# Patient Record
Sex: FEMALE | Race: WHITE | NOT HISPANIC OR LATINO | URBAN - METROPOLITAN AREA
[De-identification: names, ages, dates, MRNs, and addresses within clinical notes are randomized per-mention and may not be internally consistent; named-entity substitution may affect disease eponyms.]

---

## 2024-05-17 ENCOUNTER — OFFICE VISIT (OUTPATIENT)
Dept: WOUND CARE | Facility: CLINIC | Age: 84
End: 2024-05-17
Payer: MEDICARE

## 2024-05-17 PROCEDURE — 11042 DBRDMT SUBQ TIS 1ST 20SQCM/<: CPT

## 2024-05-17 PROCEDURE — 99213 OFFICE O/P EST LOW 20 MIN: CPT

## 2024-05-24 ENCOUNTER — OFFICE VISIT (OUTPATIENT)
Dept: WOUND CARE | Facility: CLINIC | Age: 84
End: 2024-05-24
Payer: MEDICARE

## 2024-05-24 PROCEDURE — 99212 OFFICE O/P EST SF 10 MIN: CPT

## 2024-05-31 ENCOUNTER — OFFICE VISIT (OUTPATIENT)
Dept: WOUND CARE | Facility: CLINIC | Age: 84
End: 2024-05-31
Payer: MEDICARE

## 2024-05-31 PROCEDURE — 99212 OFFICE O/P EST SF 10 MIN: CPT

## 2025-02-03 ENCOUNTER — APPOINTMENT (OUTPATIENT)
Dept: CARDIOLOGY | Facility: HOSPITAL | Age: 85
End: 2025-02-03
Payer: MEDICARE

## 2025-02-03 ENCOUNTER — APPOINTMENT (OUTPATIENT)
Dept: RADIOLOGY | Facility: HOSPITAL | Age: 85
End: 2025-02-03
Payer: MEDICARE

## 2025-02-03 ENCOUNTER — HOSPITAL ENCOUNTER (INPATIENT)
Facility: HOSPITAL | Age: 85
LOS: 2 days | Discharge: HOME | End: 2025-02-06
Attending: EMERGENCY MEDICINE | Admitting: FAMILY MEDICINE
Payer: MEDICARE

## 2025-02-03 DIAGNOSIS — K56.609 SMALL BOWEL OBSTRUCTION (MULTI): Primary | ICD-10-CM

## 2025-02-03 DIAGNOSIS — R33.9 URINARY RETENTION: ICD-10-CM

## 2025-02-03 LAB
ALBUMIN SERPL BCP-MCNC: 4.1 G/DL (ref 3.4–5)
ALP SERPL-CCNC: 53 U/L (ref 33–136)
ALT SERPL W P-5'-P-CCNC: 10 U/L (ref 7–45)
ANION GAP BLDV CALCULATED.4IONS-SCNC: 10 MMOL/L (ref 10–25)
ANION GAP SERPL CALC-SCNC: 15 MMOL/L (ref 10–20)
APPEARANCE UR: ABNORMAL
AST SERPL W P-5'-P-CCNC: 22 U/L (ref 9–39)
ATRIAL RATE: 94 BPM
BACTERIA #/AREA URNS AUTO: ABNORMAL /HPF
BASE EXCESS BLDV CALC-SCNC: 3.3 MMOL/L (ref -2–3)
BASOPHILS # BLD AUTO: 0.04 X10*3/UL (ref 0–0.1)
BASOPHILS NFR BLD AUTO: 0.3 %
BILIRUB SERPL-MCNC: 0.9 MG/DL (ref 0–1.2)
BILIRUB UR STRIP.AUTO-MCNC: NEGATIVE MG/DL
BODY TEMPERATURE: ABNORMAL
BUN SERPL-MCNC: 13 MG/DL (ref 6–23)
CA-I BLDV-SCNC: 1.2 MMOL/L (ref 1.1–1.33)
CALCIUM SERPL-MCNC: 9.4 MG/DL (ref 8.6–10.3)
CHLORIDE BLDV-SCNC: 101 MMOL/L (ref 98–107)
CHLORIDE SERPL-SCNC: 101 MMOL/L (ref 98–107)
CO2 SERPL-SCNC: 24 MMOL/L (ref 21–32)
COLOR UR: YELLOW
CREAT SERPL-MCNC: 0.69 MG/DL (ref 0.5–1.05)
EGFRCR SERPLBLD CKD-EPI 2021: 85 ML/MIN/1.73M*2
EOSINOPHIL # BLD AUTO: 0.05 X10*3/UL (ref 0–0.4)
EOSINOPHIL NFR BLD AUTO: 0.4 %
ERYTHROCYTE [DISTWIDTH] IN BLOOD BY AUTOMATED COUNT: 17.6 % (ref 11.5–14.5)
GLUCOSE BLDV-MCNC: 101 MG/DL (ref 74–99)
GLUCOSE SERPL-MCNC: 105 MG/DL (ref 74–99)
GLUCOSE UR STRIP.AUTO-MCNC: NORMAL MG/DL
HCO3 BLDV-SCNC: 24.7 MMOL/L (ref 22–26)
HCT VFR BLD AUTO: 32.6 % (ref 36–46)
HCT VFR BLD EST: 33 % (ref 36–46)
HGB BLD-MCNC: 10.4 G/DL (ref 12–16)
HGB BLDV-MCNC: 11 G/DL (ref 12–16)
HOLD SPECIMEN: NORMAL
IMM GRANULOCYTES # BLD AUTO: 0.06 X10*3/UL (ref 0–0.5)
IMM GRANULOCYTES NFR BLD AUTO: 0.5 % (ref 0–0.9)
INHALED O2 CONCENTRATION: 45 %
KETONES UR STRIP.AUTO-MCNC: ABNORMAL MG/DL
LACTATE BLDV-SCNC: 1.7 MMOL/L (ref 0.4–2)
LEUKOCYTE ESTERASE UR QL STRIP.AUTO: ABNORMAL
LIPASE SERPL-CCNC: 12 U/L (ref 9–82)
LYMPHOCYTES # BLD AUTO: 1.53 X10*3/UL (ref 0.8–3)
LYMPHOCYTES NFR BLD AUTO: 12.1 %
MCH RBC QN AUTO: 25.6 PG (ref 26–34)
MCHC RBC AUTO-ENTMCNC: 31.9 G/DL (ref 32–36)
MCV RBC AUTO: 80 FL (ref 80–100)
MONOCYTES # BLD AUTO: 0.88 X10*3/UL (ref 0.05–0.8)
MONOCYTES NFR BLD AUTO: 7 %
MUCOUS THREADS #/AREA URNS AUTO: ABNORMAL /LPF
NEUTROPHILS # BLD AUTO: 10.05 X10*3/UL (ref 1.6–5.5)
NEUTROPHILS NFR BLD AUTO: 79.7 %
NITRITE UR QL STRIP.AUTO: ABNORMAL
NRBC BLD-RTO: 0 /100 WBCS (ref 0–0)
OXYHGB MFR BLDV: 18.9 % (ref 45–75)
P AXIS: 54 DEGREES
P OFFSET: 176 MS
P ONSET: 146 MS
PCO2 BLDV: 27 MM HG (ref 41–51)
PH BLDV: 7.57 PH (ref 7.33–7.43)
PH UR STRIP.AUTO: 8.5 [PH]
PLATELET # BLD AUTO: 391 X10*3/UL (ref 150–450)
PO2 BLDV: 15 MM HG (ref 35–45)
POTASSIUM BLDV-SCNC: 4 MMOL/L (ref 3.5–5.3)
POTASSIUM SERPL-SCNC: 4.3 MMOL/L (ref 3.5–5.3)
PR INTERVAL: 162 MS
PROT SERPL-MCNC: 7.2 G/DL (ref 6.4–8.2)
PROT UR STRIP.AUTO-MCNC: ABNORMAL MG/DL
Q ONSET: 227 MS
QRS COUNT: 16 BEATS
QRS DURATION: 78 MS
QT INTERVAL: 372 MS
QTC CALCULATION(BAZETT): 465 MS
QTC FREDERICIA: 432 MS
R AXIS: 41 DEGREES
RBC # BLD AUTO: 4.06 X10*6/UL (ref 4–5.2)
RBC # UR STRIP.AUTO: ABNORMAL /UL
RBC #/AREA URNS AUTO: >20 /HPF
SAO2 % BLDV: 19 % (ref 45–75)
SODIUM BLDV-SCNC: 132 MMOL/L (ref 136–145)
SODIUM SERPL-SCNC: 136 MMOL/L (ref 136–145)
SP GR UR STRIP.AUTO: >1.05
SQUAMOUS #/AREA URNS AUTO: ABNORMAL /HPF
T AXIS: -2 DEGREES
T OFFSET: 413 MS
UROBILINOGEN UR STRIP.AUTO-MCNC: NORMAL MG/DL
VENTRICULAR RATE: 94 BPM
WBC # BLD AUTO: 12.6 X10*3/UL (ref 4.4–11.3)
WBC #/AREA URNS AUTO: >50 /HPF
YEAST BUDDING #/AREA UR COMP ASSIST: PRESENT /HPF

## 2025-02-03 PROCEDURE — 99223 1ST HOSP IP/OBS HIGH 75: CPT | Performed by: SURGERY

## 2025-02-03 PROCEDURE — 87186 SC STD MICRODIL/AGAR DIL: CPT | Mod: STJLAB

## 2025-02-03 PROCEDURE — 81001 URINALYSIS AUTO W/SCOPE: CPT

## 2025-02-03 PROCEDURE — 83690 ASSAY OF LIPASE: CPT

## 2025-02-03 PROCEDURE — 2500000004 HC RX 250 GENERAL PHARMACY W/ HCPCS (ALT 636 FOR OP/ED): Performed by: EMERGENCY MEDICINE

## 2025-02-03 PROCEDURE — 99223 1ST HOSP IP/OBS HIGH 75: CPT | Performed by: FAMILY MEDICINE

## 2025-02-03 PROCEDURE — 99285 EMERGENCY DEPT VISIT HI MDM: CPT | Performed by: EMERGENCY MEDICINE

## 2025-02-03 PROCEDURE — 36415 COLL VENOUS BLD VENIPUNCTURE: CPT

## 2025-02-03 PROCEDURE — 87086 URINE CULTURE/COLONY COUNT: CPT | Mod: STJLAB

## 2025-02-03 PROCEDURE — 96365 THER/PROPH/DIAG IV INF INIT: CPT | Mod: 59

## 2025-02-03 PROCEDURE — 84132 ASSAY OF SERUM POTASSIUM: CPT | Performed by: EMERGENCY MEDICINE

## 2025-02-03 PROCEDURE — 82435 ASSAY OF BLOOD CHLORIDE: CPT | Performed by: EMERGENCY MEDICINE

## 2025-02-03 PROCEDURE — 85025 COMPLETE CBC W/AUTO DIFF WBC: CPT

## 2025-02-03 PROCEDURE — 84075 ASSAY ALKALINE PHOSPHATASE: CPT

## 2025-02-03 PROCEDURE — 2500000004 HC RX 250 GENERAL PHARMACY W/ HCPCS (ALT 636 FOR OP/ED): Performed by: INTERNAL MEDICINE

## 2025-02-03 PROCEDURE — 2550000001 HC RX 255 CONTRASTS: Performed by: EMERGENCY MEDICINE

## 2025-02-03 PROCEDURE — 96372 THER/PROPH/DIAG INJ SC/IM: CPT | Performed by: INTERNAL MEDICINE

## 2025-02-03 PROCEDURE — 74177 CT ABD & PELVIS W/CONTRAST: CPT

## 2025-02-03 PROCEDURE — 99285 EMERGENCY DEPT VISIT HI MDM: CPT | Mod: 25 | Performed by: EMERGENCY MEDICINE

## 2025-02-03 PROCEDURE — 36415 COLL VENOUS BLD VENIPUNCTURE: CPT | Performed by: EMERGENCY MEDICINE

## 2025-02-03 PROCEDURE — 74177 CT ABD & PELVIS W/CONTRAST: CPT | Mod: FOREIGN READ | Performed by: RADIOLOGY

## 2025-02-03 PROCEDURE — 99222 1ST HOSP IP/OBS MODERATE 55: CPT | Performed by: NURSE PRACTITIONER

## 2025-02-03 PROCEDURE — 2500000004 HC RX 250 GENERAL PHARMACY W/ HCPCS (ALT 636 FOR OP/ED)

## 2025-02-03 PROCEDURE — 93005 ELECTROCARDIOGRAM TRACING: CPT

## 2025-02-03 PROCEDURE — 2500000004 HC RX 250 GENERAL PHARMACY W/ HCPCS (ALT 636 FOR OP/ED): Mod: JZ

## 2025-02-03 PROCEDURE — 96375 TX/PRO/DX INJ NEW DRUG ADDON: CPT

## 2025-02-03 PROCEDURE — 96361 HYDRATE IV INFUSION ADD-ON: CPT

## 2025-02-03 RX ORDER — HYDROMORPHONE HYDROCHLORIDE 1 MG/ML
1 INJECTION, SOLUTION INTRAMUSCULAR; INTRAVENOUS; SUBCUTANEOUS EVERY 4 HOURS PRN
Status: DISCONTINUED | OUTPATIENT
Start: 2025-02-03 | End: 2025-02-05

## 2025-02-03 RX ORDER — AMLODIPINE BESYLATE 2.5 MG/1
2.5 TABLET ORAL EVERY EVENING
COMMUNITY

## 2025-02-03 RX ORDER — ENOXAPARIN SODIUM 100 MG/ML
40 INJECTION SUBCUTANEOUS DAILY
Status: DISCONTINUED | OUTPATIENT
Start: 2025-02-03 | End: 2025-02-06 | Stop reason: HOSPADM

## 2025-02-03 RX ORDER — CEFTRIAXONE 1 G/50ML
1 INJECTION, SOLUTION INTRAVENOUS ONCE
Status: COMPLETED | OUTPATIENT
Start: 2025-02-03 | End: 2025-02-03

## 2025-02-03 RX ORDER — CYCLOBENZAPRINE HCL 5 MG
5 TABLET ORAL NIGHTLY PRN
COMMUNITY

## 2025-02-03 RX ORDER — HYDROMORPHONE HYDROCHLORIDE 1 MG/ML
INJECTION, SOLUTION INTRAMUSCULAR; INTRAVENOUS; SUBCUTANEOUS
Status: COMPLETED
Start: 2025-02-03 | End: 2025-02-03

## 2025-02-03 RX ORDER — KETOROLAC TROMETHAMINE 15 MG/ML
15 INJECTION, SOLUTION INTRAMUSCULAR; INTRAVENOUS ONCE
Status: COMPLETED | OUTPATIENT
Start: 2025-02-03 | End: 2025-02-03

## 2025-02-03 RX ORDER — LOSARTAN POTASSIUM 100 MG/1
100 TABLET ORAL EVERY EVENING
COMMUNITY

## 2025-02-03 RX ORDER — ZOLPIDEM TARTRATE 5 MG/1
5 TABLET ORAL NIGHTLY PRN
Status: DISCONTINUED | OUTPATIENT
Start: 2025-02-03 | End: 2025-02-03

## 2025-02-03 RX ORDER — AMITRIPTYLINE HYDROCHLORIDE 25 MG/1
25 TABLET, FILM COATED ORAL NIGHTLY
COMMUNITY

## 2025-02-03 RX ORDER — FLUTICASONE PROPIONATE 50 MCG
2 SPRAY, SUSPENSION (ML) NASAL NIGHTLY
Status: DISCONTINUED | OUTPATIENT
Start: 2025-02-03 | End: 2025-02-06 | Stop reason: HOSPADM

## 2025-02-03 RX ORDER — ALENDRONATE SODIUM 70 MG/1
70 TABLET ORAL
COMMUNITY

## 2025-02-03 RX ORDER — CEFTRIAXONE 1 G/50ML
1 INJECTION, SOLUTION INTRAVENOUS DAILY
Status: DISCONTINUED | OUTPATIENT
Start: 2025-02-04 | End: 2025-02-06

## 2025-02-03 RX ORDER — DEXTROSE MONOHYDRATE AND SODIUM CHLORIDE 5; .45 G/100ML; G/100ML
75 INJECTION, SOLUTION INTRAVENOUS CONTINUOUS
Status: ACTIVE | OUTPATIENT
Start: 2025-02-03 | End: 2025-02-04

## 2025-02-03 RX ORDER — PRAVASTATIN SODIUM 40 MG/1
40 TABLET ORAL NIGHTLY
COMMUNITY

## 2025-02-03 RX ORDER — METOPROLOL SUCCINATE 50 MG/1
50 TABLET, EXTENDED RELEASE ORAL EVERY EVENING
COMMUNITY

## 2025-02-03 RX ORDER — PANTOPRAZOLE SODIUM 40 MG/1
40 TABLET, DELAYED RELEASE ORAL EVERY EVENING
COMMUNITY

## 2025-02-03 RX ORDER — FLUTICASONE PROPIONATE 50 MCG
2 SPRAY, SUSPENSION (ML) NASAL NIGHTLY
COMMUNITY

## 2025-02-03 RX ORDER — NAPROXEN 375 MG/1
375 TABLET ORAL 2 TIMES DAILY PRN
COMMUNITY

## 2025-02-03 RX ORDER — ONDANSETRON HYDROCHLORIDE 2 MG/ML
4 INJECTION, SOLUTION INTRAVENOUS EVERY 8 HOURS PRN
Status: DISCONTINUED | OUTPATIENT
Start: 2025-02-03 | End: 2025-02-06 | Stop reason: HOSPADM

## 2025-02-03 RX ORDER — PANTOPRAZOLE SODIUM 40 MG/10ML
40 INJECTION, POWDER, LYOPHILIZED, FOR SOLUTION INTRAVENOUS DAILY
Status: DISCONTINUED | OUTPATIENT
Start: 2025-02-03 | End: 2025-02-06 | Stop reason: HOSPADM

## 2025-02-03 RX ADMIN — IOHEXOL 70 ML: 350 INJECTION, SOLUTION INTRAVENOUS at 10:11

## 2025-02-03 RX ADMIN — KETOROLAC TROMETHAMINE 15 MG: 15 INJECTION, SOLUTION INTRAMUSCULAR; INTRAVENOUS at 12:45

## 2025-02-03 RX ADMIN — PANTOPRAZOLE SODIUM 40 MG: 40 INJECTION, POWDER, FOR SOLUTION INTRAVENOUS at 14:54

## 2025-02-03 RX ADMIN — ENOXAPARIN SODIUM 40 MG: 40 INJECTION SUBCUTANEOUS at 14:53

## 2025-02-03 RX ADMIN — HYDROMORPHONE HYDROCHLORIDE 1 MG: 1 INJECTION, SOLUTION INTRAMUSCULAR; INTRAVENOUS; SUBCUTANEOUS at 13:44

## 2025-02-03 RX ADMIN — CEFTRIAXONE SODIUM 1 G: 1 INJECTION, SOLUTION INTRAVENOUS at 12:05

## 2025-02-03 RX ADMIN — SODIUM CHLORIDE 1686 ML: 9 INJECTION, SOLUTION INTRAVENOUS at 11:59

## 2025-02-03 RX ADMIN — DEXTROSE AND SODIUM CHLORIDE 75 ML/HR: 5; 450 INJECTION, SOLUTION INTRAVENOUS at 14:54

## 2025-02-03 ASSESSMENT — PAIN DESCRIPTION - ORIENTATION: ORIENTATION: LEFT;UPPER;LOWER

## 2025-02-03 ASSESSMENT — COLUMBIA-SUICIDE SEVERITY RATING SCALE - C-SSRS
2. HAVE YOU ACTUALLY HAD ANY THOUGHTS OF KILLING YOURSELF?: NO
1. IN THE PAST MONTH, HAVE YOU WISHED YOU WERE DEAD OR WISHED YOU COULD GO TO SLEEP AND NOT WAKE UP?: NO
6. HAVE YOU EVER DONE ANYTHING, STARTED TO DO ANYTHING, OR PREPARED TO DO ANYTHING TO END YOUR LIFE?: NO

## 2025-02-03 ASSESSMENT — LIFESTYLE VARIABLES
HAVE PEOPLE ANNOYED YOU BY CRITICIZING YOUR DRINKING: NO
HAVE YOU EVER FELT YOU SHOULD CUT DOWN ON YOUR DRINKING: NO
EVER FELT BAD OR GUILTY ABOUT YOUR DRINKING: NO
TOTAL SCORE: 0
EVER HAD A DRINK FIRST THING IN THE MORNING TO STEADY YOUR NERVES TO GET RID OF A HANGOVER: NO

## 2025-02-03 ASSESSMENT — PAIN SCALES - GENERAL
PAINLEVEL_OUTOF10: 5 - MODERATE PAIN
PAINLEVEL_OUTOF10: 10 - WORST POSSIBLE PAIN
PAINLEVEL_OUTOF10: 10 - WORST POSSIBLE PAIN
PAINLEVEL_OUTOF10: 5 - MODERATE PAIN
PAINLEVEL_OUTOF10: 10 - WORST POSSIBLE PAIN

## 2025-02-03 ASSESSMENT — PAIN - FUNCTIONAL ASSESSMENT: PAIN_FUNCTIONAL_ASSESSMENT: 0-10

## 2025-02-03 ASSESSMENT — PAIN DESCRIPTION - DESCRIPTORS: DESCRIPTORS: ACHING

## 2025-02-03 ASSESSMENT — PAIN DESCRIPTION - LOCATION: LOCATION: ABDOMEN

## 2025-02-03 NOTE — CONSULTS
Reason for consult  SBO with previous GI malignancy    HPI  Kayla Estes is a 84 y.o. female with PMH of anal CA in 2006/7 s/p chemotherapy and radiation presenting with diffuse abdominal pain.  Pain is severe and came on suddenly at 4am when she got up to go to the bathroom.  She believes she had a small amount of urine out.  She states that currently she is unable to urinate though they were able to obtain a small sample in the ED upon arrival.  She endorses chronic constipation for which she takes Metamucil though is unclear on frequency as well as recommendations for MiraLAX which she rarely takes.  She notes that she has small marbles of stool every couple of days or so and often attempt manual disimpaction.  She denies passing flatus.  She did note nausea without vomiting at home with no current nausea.  The pain is diffuse across her abdomen and she feels like fluid is shifting from side-to-side with movement with a significant burning sensation.  She follows with Dr. Twin Martínez for outpatient GI management.      CT notes moderate to high-grade SBO with transition in the RLQ with proximal fecal material with multifocal areas of stenosis/strictures.  No free air or abscess.  Plaque-like thickening in the urinary bladder with neoplastic disease suspected.  Surgery and urology also consulted.    Patient had a flex sigmoidoscopy with Dr. Martínez 1/10/2024 with normal findings.    PMH  HTN, GERD, anal CA in 2006/7 s/p chemo and radiation, spinal stenosis, osteoarthritis    PSH  Laminectomy, knee arthroscopy, cataract surgery    Family  Mother-leukemia    Social  She reports that she has never smoked. She does not have any smokeless tobacco history on file. She reports that she does not drink alcohol and does not use drugs.      Review of Systems  ROS negative unless stated otherwise in HPI     Objective  /75 (BP Location: Right arm, Patient Position: Lying)   Pulse (!) 107   Temp 36.9 °C (98.4 °F)  "(Temporal)   Resp 18   Ht 1.626 m (5' 4\")   Wt 56.2 kg (124 lb)   SpO2 98%   BMI 21.28 kg/m²     Physical Exam  Constitutional: Alert, pleasant and interactive, in visible distress, rubbing abdomen, daughter at bedside  Eyes: PERRL, sclera clear, no conjunctival injection  Skin: Warm and dry, no rash or ecchymosis  ENMT: Mucous membranes moist, no lesions noted  Resp: CTAB, even and unlabored  CV: RRR, normal S1, S2, no m,r,g  GI: +BS, semifirm, round, diffusely tender, no rebound tenderness or guarding, no palpable masses or organomegaly  Extremities: Extremities warm, no edema, contusions, wounds or cyanosis  Neuro: Alert and oriented x3  Psych: Appropriate mood and behavior    Medications  Scheduled medications  cefTRIAXone, 1 g, intravenous, Daily  enoxaparin, 40 mg, subcutaneous, Daily  sodium chloride, 30 mL/kg, intravenous, Once      Continuous medications  dextrose 5%-0.45 % sodium chloride, 75 mL/hr      PRN medications  PRN medications: HYDROmorphone, ondansetron, zolpidem     Labs  Lab Results   Component Value Date    WBC 12.6 (H) 02/03/2025    HGB 10.4 (L) 02/03/2025    HCT 32.6 (L) 02/03/2025    MCV 80 02/03/2025     02/03/2025     Lab Results   Component Value Date    GLUCOSE 105 (H) 02/03/2025    CALCIUM 9.4 02/03/2025     02/03/2025    K 4.3 02/03/2025    CO2 24 02/03/2025     02/03/2025    BUN 13 02/03/2025    CREATININE 0.69 02/03/2025     Lab Results   Component Value Date    ALT 10 02/03/2025    AST 22 02/03/2025    ALKPHOS 53 02/03/2025    BILITOT 0.9 02/03/2025     No results found for: \"IRON\", \"TIBC\", \"FERRITIN\"  No results found for: \"INR\", \"PROTIME\"    Radiology  CT A/P with IV contrast 2/3/2025 noting:  IMPRESSION:  Moderate to high-grade small bowel obstruction with transition in the  right lower quadrant likely secondary to enteritis and/or multifocal  areas of stenosis/strictures of the small bowel associated with  enteritis.  No free air or abscess.  The small " bowel is viable.  Plaque-like wall thickening of the right lateral urinary bladder.   Neoplastic disease is suspected.  Urology follow-up recommended for  cystoscopy evaluation.  Minimal ascites.  Cholelithiasis.  Hiatal hernia.  Signed by Seven Best DO    Assessment:  Kayla Estes is a 84 y.o. female with PMH of anal CA s/p chemotherapy and radiation presenting with abdominal pain and nausea.  Pain came on suddenly at 4 AM.  She had nausea without vomiting but no further nausea at this time.  CT noting high-grade SBO with transition in RLQ with multifocal areas of stenosis/strictures, no free air or abscess.  Plaque-like thickening in the urinary bladder with neoplastic disease suspected.  Patient follows with Dr. Martínez for outpatient GI care with most recent flex sigmoidoscopy 1/10/2024 with normal findings.  Surgery and urology also on consult.    # acute abdominal pain  # high grade SBO on CT with areas of stenosis/strictures  # plaque like bladder thickening with c/f neoplastic disease  # leukocytosis  # chronic constipation    Plan:  - continue supportive care  - keep NPO  - recommend NG to LIWS  - follow up surgery and urology recs  - continue analgesics and antiemetics as needed  - pt will continue to follow up with Dr. Martínez in clinic    Plan has been discussed with Dr. So. GI will continue to follow.     Gavi Alston, HAKEEM/CNP

## 2025-02-03 NOTE — PROGRESS NOTES
Spiritual Care Visit  Spiritual Care Request    Reason for Visit:  Routine Visit: Introduction     Request Received From:       Focus of Care:  Visited With: Patient         Refer to :          Spiritual Care Assessment    Spiritual Assessment:                      Care Provided:  Intended Effects: Promote sense of peace, Preserve dignity and respect, Meaning-making  Methods: Offer spiritual/Protestant support  Interventions: Share words of hope and inspiration, Adams    Sense of Community and or Hoahaoism Affiliation:  Jewish         Addressed Needs/Concerns and/or Hugo Through:          Outcome:        Advance Directives:         Spiritual Care Annotation    Annotation:  Patient saw the  and spoke to him.  Patient shared her troubles and the  spoke comforting words and listened.  Patient is involved with her Jewish Zoroastrian and said her daughter is on the way to the hospital.   prayed at her request.

## 2025-02-03 NOTE — H&P
History Of Present Illness  Kayla Estes is a 84 y.o. female presenting with abdominal pain and nausea.  The patient has a history of anal cancer in 2000 6/2007 status postchemotherapy and radiation who presents to the emergency room for evaluation of abdominal pain.  Pain started suddenly, severe, associated with nausea.  She last had a bowel movement this morning.  Denies fever or chills.  CT of the abdomen pelvis showed Moderate to high-grade small bowel obstruction with transition in the right lower quadrant likely secondary to enteritis and/or multifocal areas of stenosis/strictures of the small bowel associated with enteritis.  No free air or abscess.  The small bowel is viable. Plaque-like wall thickening of the right lateral urinary bladder. Neoplastic disease is suspected.  She is admitted for further management     Past Medical History  She has a past medical history of Acid reflux, Cancer (Multi), and Hypertension.     Social History  She reports that she has never smoked. She does not have any smokeless tobacco history on file. She reports that she does not drink alcohol and does not use drugs.    Family History  Noncontributory due to advanced age     Allergies  Patient has no known allergies.    Review of Systems  As in history present illness, otherwise negative    Physical Exam  Alert oriented x 3  Lungs clear to auscultation bilaterally  Heart regular rhythm  Abdomen distended, very tender to palpation  Extremities no leg edema  CNS no focal deficit    Last Recorded Vitals  /75 (BP Location: Right arm, Patient Position: Lying)   Pulse (!) 107   Temp 36.9 °C (98.4 °F) (Temporal)   Resp 18   Wt 56.2 kg (124 lb)   SpO2 98%     Relevant Results  Results for orders placed or performed during the hospital encounter of 02/03/25 (from the past 24 hours)   CBC and Auto Differential   Result Value Ref Range    WBC 12.6 (H) 4.4 - 11.3 x10*3/uL    nRBC 0.0 0.0 - 0.0 /100 WBCs    RBC 4.06 4.00 -  5.20 x10*6/uL    Hemoglobin 10.4 (L) 12.0 - 16.0 g/dL    Hematocrit 32.6 (L) 36.0 - 46.0 %    MCV 80 80 - 100 fL    MCH 25.6 (L) 26.0 - 34.0 pg    MCHC 31.9 (L) 32.0 - 36.0 g/dL    RDW 17.6 (H) 11.5 - 14.5 %    Platelets 391 150 - 450 x10*3/uL    Neutrophils % 79.7 40.0 - 80.0 %    Immature Granulocytes %, Automated 0.5 0.0 - 0.9 %    Lymphocytes % 12.1 13.0 - 44.0 %    Monocytes % 7.0 2.0 - 10.0 %    Eosinophils % 0.4 0.0 - 6.0 %    Basophils % 0.3 0.0 - 2.0 %    Neutrophils Absolute 10.05 (H) 1.60 - 5.50 x10*3/uL    Immature Granulocytes Absolute, Automated 0.06 0.00 - 0.50 x10*3/uL    Lymphocytes Absolute 1.53 0.80 - 3.00 x10*3/uL    Monocytes Absolute 0.88 (H) 0.05 - 0.80 x10*3/uL    Eosinophils Absolute 0.05 0.00 - 0.40 x10*3/uL    Basophils Absolute 0.04 0.00 - 0.10 x10*3/uL   Comprehensive metabolic panel   Result Value Ref Range    Glucose 105 (H) 74 - 99 mg/dL    Sodium 136 136 - 145 mmol/L    Potassium 4.3 3.5 - 5.3 mmol/L    Chloride 101 98 - 107 mmol/L    Bicarbonate 24 21 - 32 mmol/L    Anion Gap 15 10 - 20 mmol/L    Urea Nitrogen 13 6 - 23 mg/dL    Creatinine 0.69 0.50 - 1.05 mg/dL    eGFR 85 >60 mL/min/1.73m*2    Calcium 9.4 8.6 - 10.3 mg/dL    Albumin 4.1 3.4 - 5.0 g/dL    Alkaline Phosphatase 53 33 - 136 U/L    Total Protein 7.2 6.4 - 8.2 g/dL    AST 22 9 - 39 U/L    Bilirubin, Total 0.9 0.0 - 1.2 mg/dL    ALT 10 7 - 45 U/L   Lipase   Result Value Ref Range    Lipase 12 9 - 82 U/L   Urinalysis with Reflex Culture and Microscopic   Result Value Ref Range    Color, Urine Yellow Light-Yellow, Yellow, Dark-Yellow    Appearance, Urine Turbid (N) Clear    Specific Gravity, Urine >1.050 (N) 1.005 - 1.035    pH, Urine 8.5 (N) 5.0, 5.5, 6.0, 6.5, 7.0, 7.5, 8.0    Protein, Urine 30 (1+) (A) NEGATIVE, 10 (TRACE), 20 (TRACE) mg/dL    Glucose, Urine Normal Normal mg/dL    Blood, Urine 0.06 (1+) (A) NEGATIVE    Ketones, Urine 20 (1+) (A) NEGATIVE mg/dL    Bilirubin, Urine NEGATIVE NEGATIVE    Urobilinogen, Urine  Normal Normal mg/dL    Nitrite, Urine 2+ (A) NEGATIVE    Leukocyte Esterase, Urine 500 Sarah/uL (A) NEGATIVE   Microscopic Only, Urine   Result Value Ref Range    WBC, Urine >50 (A) 1-5, NONE /HPF    RBC, Urine >20 (A) NONE, 1-2, 3-5 /HPF    Squamous Epithelial Cells, Urine 1-9 (SPARSE) Reference range not established. /HPF    Bacteria, Urine 4+ (A) NONE SEEN /HPF    Budding Yeast, Urine PRESENT (A) NONE /HPF    Mucus, Urine FEW Reference range not established. /LPF   BLOOD GAS VENOUS FULL PANEL   Result Value Ref Range    POCT pH, Venous 7.57 (H) 7.33 - 7.43 pH    POCT pCO2, Venous 27 (L) 41 - 51 mm Hg    POCT pO2, Venous 15 (L) 35 - 45 mm Hg    POCT SO2, Venous 19 (L) 45 - 75 %    POCT Oxy Hemoglobin, Venous 18.9 (L) 45.0 - 75.0 %    POCT Hematocrit Calculated, Venous 33.0 (L) 36.0 - 46.0 %    POCT Sodium, Venous 132 (L) 136 - 145 mmol/L    POCT Potassium, Venous 4.0 3.5 - 5.3 mmol/L    POCT Chloride, Venous 101 98 - 107 mmol/L    POCT Ionized Calicum, Venous 1.20 1.10 - 1.33 mmol/L    POCT Glucose, Venous 101 (H) 74 - 99 mg/dL    POCT Lactate, Venous 1.7 0.4 - 2.0 mmol/L    POCT Base Excess, Venous 3.3 (H) -2.0 - 3.0 mmol/L    POCT HCO3 Calculated, Venous 24.7 22.0 - 26.0 mmol/L    POCT Hemoglobin, Venous 11.0 (L) 12.0 - 16.0 g/dL    POCT Anion Gap, Venous 10.0 10.0 - 25.0 mmol/L    Patient Temperature      FiO2 45 %       Assessment/Plan   Small bowel obstruction  Presumed urinary tract infection  History of GI malignancy  Anemia, hypertension    Plan:  N.p.o., IV fluids, pain medications and antiemetics as needed  IV ceftriaxone, await urine culture  The patient has a plaque-like protrusion in the bladder wall seen on CT, Consult urology for further evaluation   Consult general surgery and GI (she follows with Dr Peralta)  DVT prophylaxis      Miguel Angel Vieira MD

## 2025-02-03 NOTE — CONSULTS
"History and Physical        Referring Provider: ED        Chief Complaint:  Gastroenteritis     History of Present Illness:  This is an 84-year-old female presenting with abdominal bloating and GERD with nausea but no emesis. She is endorsing normal bowel movements.  She has a history of anal cancer that was treated with radiation and chemotherapy and she is head numerous UTIs.  She now has a new cream that she puts on her perineum to help with the sequelae of radiation however she says that she applies it from the rectum forward and noticed that that may be causing her UTIs.        Past Medical History:    Hypertension, GERD, anal cancer, spinal stenosis, osteoarthritis        Past Surgical History:   laminectomy, bilateral knee arthroscopies, bilateral cataract excisions        Medications:   as per medical record        Allergies:  No Known Drug Allergies        Family History:  The information was reviewed and no pertinent findings are relevant to the presenting problem.        Social History:  Patient is retired, lives at home with her , her children are nearby, she denies smoking, occasionally drinks EtOH, denies IDU.        Review of Systems  A complete 10 point review of systems was performed and is negative except as noted in the history of present illness.     Physical Exam:  /65 (BP Location: Right arm, Patient Position: Sitting)   Pulse 97   Temp 36.9 °C (98.4 °F) (Temporal)   Resp 18   Ht 1.626 m (5' 4\")   Wt 56.2 kg (124 lb)   SpO2 98%   BMI 21.28 kg/m²     General: No acute distress. Sitting up in bed.   Neuro: Alert and oriented ×3. Follows commands.  Head: Atraumatic  Eyes: Pupils equal reactive to light. Extraocular motions intact.  Ears: Hears normal speaking voice.  Mouth, Nose, Throat: Mucous membranes moist.  Normal dentition.  Neck: Supple. No appreciable masses.  Breast: Not examined.  Chest: Nonlabored breathing, bilateral chest rise.  Heart: Palpable regular rate and " rhythm.  Lung: Clear to auscultation bilaterally.  Vascular: Palpable radial pulses bilaterally.  Abdomen: Soft. Distended. Nontender.  No appreciable hernias or scars.  Rectal: Not examined.  Genitourinary: Not examined.  Musculoskeletal: Moves all extremities.  Normal range of motion.  Lymphatic: No palpable lymph nodes.  Skin: No rashes or lesions.  Psychological: Normal affect        Labs:    Leukocytosis to 12.6 with a left shift, UA positive      Imaging: I have personally reviewed the images and the radiologist's report.   CT shows diffuse small bowel enteritis.  There is no exact transition point, no free air or free fluid.           Assessment:  This is an 84-year-old female with history of anal cancer treated with extensive radiation and chemotherapy, with frequent UTIs.  Today she presents with abdominal bloating and was found to have small bowel enteritis along with a UTI.  The patient has not been vomiting, therefore I do not recommend an NGT to be placed,  And she is having bowel movements.  However I recommend bowel rest and IV fluid resuscitation along with IV antibiotics for treatment of UTI.        Plan:  --  recommend admission to medicine for supportive care  -- there is no acute general surgery need  -- please call/text with any questions/concerns           Elsy Hearn MD MPH  General Surgery  Office: [phone]  Fax: [phone]

## 2025-02-03 NOTE — ED PROVIDER NOTES
"EMERGENCY DEPARTMENT ENCOUNTER      Pt Name: Kayla Estes  MRN: [id number]  Birthdate 1940  Date of evaluation: 2/3/2025  Provider: EDDI GARCIA    CHIEF COMPLAINT     No chief complaint on file.        HISTORY OF PRESENT ILLNESS    Patient is an 84-year-old female with a past medical history of anal cancer, reflux, hypertension, presenting to the ED with complaints of abdominal pain which began this morning.  She identifies left-sided abdominal discomfort/pain which she describes as waxing waning and at this time like a \"pressure-like sensation\".  She also admits to pain in her left lower extremity which is somewhat like an ache and somewhat like a shooting pain down the back of her leg.  She has been taking Tums at home relieved some of her symptoms are related to reflux.  Otherwise she denies any fevers or chills, nausea, chest pain, dyspnea, palpitations.  She last had a bowel movement this morning which was normal in consistency.          Nursing Notes were reviewed.    PAST MEDICAL HISTORY     Past Medical History:   Diagnosis Date    Acid reflux     Cancer (Multi)     Hypertension          SURGICAL HISTORY     History reviewed. No pertinent surgical history.      CURRENT MEDICATIONS       Previous Medications    No medications on file       ALLERGIES     Patient has no known allergies.    FAMILY HISTORY     No family history on file.       SOCIAL HISTORY       Social History     Socioeconomic History    Marital status:    Tobacco Use    Smoking status: Never   Substance and Sexual Activity    Alcohol use: Never    Drug use: Never       SCREENINGS                        PHYSICAL EXAM    (up to 7 for level 4, 8 or more for level 5)     ED Triage Vitals [02/03/25 0733]   Temperature Heart Rate Respirations BP   36.9 °C (98.4 °F) (!) 104 18 165/78      Pulse Ox Temp Source Heart Rate Source Patient Position   99 % Temporal -- Lying      BP Location FiO2 (%)     Left arm --       Physical " Exam  Constitutional:       General: She is not in acute distress.     Appearance: She is not toxic-appearing.   HENT:      Head: Normocephalic.      Nose: Nose normal.      Mouth/Throat:      Mouth: Mucous membranes are moist.   Eyes:      Extraocular Movements: Extraocular movements intact.   Cardiovascular:      Rate and Rhythm: Normal rate and regular rhythm.      Pulses: Normal pulses.      Heart sounds: No murmur heard.     No friction rub. No gallop.   Pulmonary:      Effort: Pulmonary effort is normal. No respiratory distress.      Breath sounds: No wheezing or rhonchi.   Abdominal:      General: Abdomen is flat. There is no distension.      Palpations: Abdomen is soft.      Tenderness: There is abdominal tenderness (LUQ and LLQ tenderness to palpation). There is no guarding or rebound.   Musculoskeletal:         General: Tenderness (LLE, endorses pain around hip with palpation) present. No swelling or deformity.   Skin:     General: Skin is warm and dry.      Capillary Refill: Capillary refill takes less than 2 seconds.      Coloration: Skin is not jaundiced.      Findings: No bruising.   Neurological:      General: No focal deficit present.      Mental Status: She is alert.   Psychiatric:         Mood and Affect: Mood normal.         Behavior: Behavior normal.          DIAGNOSTIC RESULTS     LABS:  Labs Reviewed   URINALYSIS WITH REFLEX CULTURE AND MICROSCOPIC    Narrative:     The following orders were created for panel order Urinalysis with Reflex Culture and Microscopic.  Procedure                               Abnormality         Status                     ---------                               -----------         ------                     Urinalysis with Reflex C...[[id number]]                                                 Extra Urine Gray Tube[[id number]]                                                         Please view results for these tests on the individual orders.   CBC WITH AUTO  "DIFFERENTIAL   COMPREHENSIVE METABOLIC PANEL   LIPASE   URINALYSIS WITH REFLEX CULTURE AND MICROSCOPIC   EXTRA URINE GRAY TUBE       All other labs were within normal range or not returned as of this dictation.    Imaging  CT abdomen pelvis w IV contrast    (Results Pending)        Procedures  Please see separate procedure documentation for further details.     EMERGENCY DEPARTMENT COURSE/MDM:   Medical Decision Making  Patient is a 4-year-old female with PMHx including anal cancer, GERD, HTN, presenting to the ED with complaints of left-sided abdominal pain.  Per initial vitals, she is tachycardic to 104, otherwise stable with temp 36.9 °C, BP of 165/78,  SpO2 of 99% on room air.  On examination, she does demonstrate moderate tenderness with palpation of the left upper and lower quadrants of the abdomen 5.  Will initiate workup with CBC, CMP to check for infectious process versus electrolyte derangement, UA for potential UTI, lipase to assess pancreas, and CT A/P for further evaluation of abdominal pain.  Patient declines any need for pain/nausea medication at this time.     [1140] CTAP resulted showing moderate to high-grade small bowel obstruction with transition in the right lower quadrant, \"likely secondary to enteritis and/or multifocal areas of stenosis/strictures of the small bowel associated with enteritis.\". negative for free air.  CBC is demonstrated a mild leukocytosis 12.6, Hgb mildly depressed at 10.4.  CMP unremarkable.  Patient endorsed to nursing staff that she was unable to initiate urination, bladder scan pending.  Will discuss patient with surgery, anticipate patient admission, placement of NG tube.  [1230] spoke with surgery, Dr. Hearn, who reviewed CT imaging and advises that patient's condition is consistent with enteritis.  She agrees with plan for medical admission, no indication for surgical intervention for surgery to follow at this time beyond initial consult.  Per surgery " recommendations, will hold on NG tube if patient remains asymptomatic in terms of nausea/vomiting.  UA demonstrates 4+ bacteria, yeast, WBCs and RBCs, positive nitrite and leukocyte esterase. Patient to be started on Rocephin. She endorses worsening abdominal pain and will be given Toradol.  Patient admitted to medicine service for further supportive care, as well as inpatient general surgery assessment.  Diagnostic findings and treatment plan discussed with patient.  Patient amenable to plan.        Please see ED course for additional MDM.    ED Course as of 02/03/25 1230   Mon Feb 03, 2025   1207 Consulted on-call surgery, Dr. Hearn who recommended NG tube if needed for nausea/vomiting, n.p.o., fluid resuscitation. [MI]   1207 Patient found to have UTI.  Patient treated with Rocephin antibiotics. [MI]   1207 Patient admitted to medicine service for further supportive therapy for ileus versus SBO. [MI]      ED Course User Index  [MI] Sonya Du MD         Diagnoses as of 02/03/25 1230   Small bowel obstruction (Multi)        CT abdomen pelvis w IV contrast    (Results Pending)       Patient and or family in agreement and understanding of treatment plan.  All questions answered.      I reviewed the case with the attending ED physician. The attending ED physician agrees with the plan. Patient and/or patient´s representative was counseled regarding labs, imaging, likely diagnosis, and plan. All questions were answered.    ED Medications administered this visit:  Medications - No data to display    New Prescriptions from this visit:    New Prescriptions    No medications on file       Follow-up:  No follow-up provider specified.      Final Impression: No diagnosis found.      (Please note that portions of this note were completed with a voice recognition program.  Efforts were made to edit the dictations but occasionally words are mis-transcribed.)     Sonya Du MD  Resident  02/03/25 [id number]

## 2025-02-04 LAB
ANION GAP SERPL CALC-SCNC: 8 MMOL/L (ref 10–20)
BUN SERPL-MCNC: 11 MG/DL (ref 6–23)
CALCIUM SERPL-MCNC: 8 MG/DL (ref 8.6–10.3)
CHLORIDE SERPL-SCNC: 105 MMOL/L (ref 98–107)
CO2 SERPL-SCNC: 25 MMOL/L (ref 21–32)
CREAT SERPL-MCNC: 0.63 MG/DL (ref 0.5–1.05)
EGFRCR SERPLBLD CKD-EPI 2021: 87 ML/MIN/1.73M*2
ERYTHROCYTE [DISTWIDTH] IN BLOOD BY AUTOMATED COUNT: 18.1 % (ref 11.5–14.5)
GLUCOSE SERPL-MCNC: 100 MG/DL (ref 74–99)
HCT VFR BLD AUTO: 27.5 % (ref 36–46)
HGB BLD-MCNC: 8.4 G/DL (ref 12–16)
MCH RBC QN AUTO: 24.9 PG (ref 26–34)
MCHC RBC AUTO-ENTMCNC: 30.5 G/DL (ref 32–36)
MCV RBC AUTO: 81 FL (ref 80–100)
NRBC BLD-RTO: 0 /100 WBCS (ref 0–0)
PLATELET # BLD AUTO: 335 X10*3/UL (ref 150–450)
POTASSIUM SERPL-SCNC: 3.2 MMOL/L (ref 3.5–5.3)
RBC # BLD AUTO: 3.38 X10*6/UL (ref 4–5.2)
SODIUM SERPL-SCNC: 135 MMOL/L (ref 136–145)
WBC # BLD AUTO: 7.7 X10*3/UL (ref 4.4–11.3)

## 2025-02-04 PROCEDURE — 96372 THER/PROPH/DIAG INJ SC/IM: CPT | Performed by: INTERNAL MEDICINE

## 2025-02-04 PROCEDURE — 82374 ASSAY BLOOD CARBON DIOXIDE: CPT | Performed by: INTERNAL MEDICINE

## 2025-02-04 PROCEDURE — 96376 TX/PRO/DX INJ SAME DRUG ADON: CPT | Mod: 59

## 2025-02-04 PROCEDURE — 99233 SBSQ HOSP IP/OBS HIGH 50: CPT | Performed by: INTERNAL MEDICINE

## 2025-02-04 PROCEDURE — 96375 TX/PRO/DX INJ NEW DRUG ADDON: CPT | Mod: 59

## 2025-02-04 PROCEDURE — 51701 INSERT BLADDER CATHETER: CPT

## 2025-02-04 PROCEDURE — 1100000001 HC PRIVATE ROOM DAILY

## 2025-02-04 PROCEDURE — 2500000004 HC RX 250 GENERAL PHARMACY W/ HCPCS (ALT 636 FOR OP/ED): Mod: JZ | Performed by: INTERNAL MEDICINE

## 2025-02-04 PROCEDURE — 85027 COMPLETE CBC AUTOMATED: CPT | Performed by: INTERNAL MEDICINE

## 2025-02-04 PROCEDURE — 36415 COLL VENOUS BLD VENIPUNCTURE: CPT | Performed by: INTERNAL MEDICINE

## 2025-02-04 PROCEDURE — 99232 SBSQ HOSP IP/OBS MODERATE 35: CPT | Performed by: NURSE PRACTITIONER

## 2025-02-04 PROCEDURE — 2500000002 HC RX 250 W HCPCS SELF ADMINISTERED DRUGS (ALT 637 FOR MEDICARE OP, ALT 636 FOR OP/ED): Performed by: INTERNAL MEDICINE

## 2025-02-04 PROCEDURE — 51798 US URINE CAPACITY MEASURE: CPT

## 2025-02-04 RX ORDER — DEXTROSE MONOHYDRATE AND SODIUM CHLORIDE 5; .45 G/100ML; G/100ML
75 INJECTION, SOLUTION INTRAVENOUS CONTINUOUS
Status: DISCONTINUED | OUTPATIENT
Start: 2025-02-04 | End: 2025-02-05

## 2025-02-04 RX ADMIN — DEXTROSE AND SODIUM CHLORIDE 75 ML/HR: 5; 450 INJECTION, SOLUTION INTRAVENOUS at 16:11

## 2025-02-04 RX ADMIN — DEXTROSE AND SODIUM CHLORIDE 75 ML/HR: 5; 450 INJECTION, SOLUTION INTRAVENOUS at 01:40

## 2025-02-04 RX ADMIN — HYDROMORPHONE HYDROCHLORIDE 1 MG: 1 INJECTION, SOLUTION INTRAMUSCULAR; INTRAVENOUS; SUBCUTANEOUS at 01:39

## 2025-02-04 RX ADMIN — PANTOPRAZOLE SODIUM 40 MG: 40 INJECTION, POWDER, FOR SOLUTION INTRAVENOUS at 09:15

## 2025-02-04 RX ADMIN — HYDROMORPHONE HYDROCHLORIDE 1 MG: 1 INJECTION, SOLUTION INTRAMUSCULAR; INTRAVENOUS; SUBCUTANEOUS at 21:34

## 2025-02-04 RX ADMIN — ENOXAPARIN SODIUM 40 MG: 40 INJECTION SUBCUTANEOUS at 09:15

## 2025-02-04 RX ADMIN — HYDROMORPHONE HYDROCHLORIDE 1 MG: 1 INJECTION, SOLUTION INTRAMUSCULAR; INTRAVENOUS; SUBCUTANEOUS at 14:23

## 2025-02-04 RX ADMIN — FLUTICASONE PROPIONATE 2 SPRAY: 50 SPRAY, METERED NASAL at 21:34

## 2025-02-04 RX ADMIN — CEFTRIAXONE SODIUM 1 G: 1 INJECTION, SOLUTION INTRAVENOUS at 09:14

## 2025-02-04 SDOH — SOCIAL STABILITY: SOCIAL INSECURITY: HAS ANYONE EVER THREATENED TO HURT YOUR FAMILY OR YOUR PETS?: NO

## 2025-02-04 SDOH — SOCIAL STABILITY: SOCIAL INSECURITY
WITHIN THE LAST YEAR, HAVE YOU BEEN KICKED, HIT, SLAPPED, OR OTHERWISE PHYSICALLY HURT BY YOUR PARTNER OR EX-PARTNER?: PATIENT DECLINED

## 2025-02-04 SDOH — SOCIAL STABILITY: SOCIAL INSECURITY: WERE YOU ABLE TO COMPLETE ALL THE BEHAVIORAL HEALTH SCREENINGS?: YES

## 2025-02-04 SDOH — ECONOMIC STABILITY: FOOD INSECURITY
WITHIN THE PAST 12 MONTHS, YOU WORRIED THAT YOUR FOOD WOULD RUN OUT BEFORE YOU GOT THE MONEY TO BUY MORE.: PATIENT DECLINED

## 2025-02-04 SDOH — SOCIAL STABILITY: SOCIAL INSECURITY: ARE THERE ANY APPARENT SIGNS OF INJURIES/BEHAVIORS THAT COULD BE RELATED TO ABUSE/NEGLECT?: NO

## 2025-02-04 SDOH — SOCIAL STABILITY: SOCIAL INSECURITY
WITHIN THE LAST YEAR, HAVE YOU BEEN HUMILIATED OR EMOTIONALLY ABUSED IN OTHER WAYS BY YOUR PARTNER OR EX-PARTNER?: PATIENT DECLINED

## 2025-02-04 SDOH — ECONOMIC STABILITY: INCOME INSECURITY
IN THE PAST 12 MONTHS HAS THE ELECTRIC, GAS, OIL, OR WATER COMPANY THREATENED TO SHUT OFF SERVICES IN YOUR HOME?: PATIENT DECLINED

## 2025-02-04 SDOH — SOCIAL STABILITY: SOCIAL INSECURITY: DO YOU FEEL ANYONE HAS EXPLOITED OR TAKEN ADVANTAGE OF YOU FINANCIALLY OR OF YOUR PERSONAL PROPERTY?: NO

## 2025-02-04 SDOH — SOCIAL STABILITY: SOCIAL INSECURITY
WITHIN THE LAST YEAR, HAVE YOU BEEN RAPED OR FORCED TO HAVE ANY KIND OF SEXUAL ACTIVITY BY YOUR PARTNER OR EX-PARTNER?: PATIENT DECLINED

## 2025-02-04 SDOH — SOCIAL STABILITY: SOCIAL INSECURITY: ABUSE: ADULT

## 2025-02-04 SDOH — ECONOMIC STABILITY: FOOD INSECURITY: WITHIN THE PAST 12 MONTHS, THE FOOD YOU BOUGHT JUST DIDN'T LAST AND YOU DIDN'T HAVE MONEY TO GET MORE.: PATIENT DECLINED

## 2025-02-04 SDOH — SOCIAL STABILITY: SOCIAL INSECURITY: DOES ANYONE TRY TO KEEP YOU FROM HAVING/CONTACTING OTHER FRIENDS OR DOING THINGS OUTSIDE YOUR HOME?: NO

## 2025-02-04 SDOH — SOCIAL STABILITY: SOCIAL INSECURITY: HAVE YOU HAD THOUGHTS OF HARMING ANYONE ELSE?: NO

## 2025-02-04 SDOH — SOCIAL STABILITY: SOCIAL INSECURITY: ARE YOU OR HAVE YOU BEEN THREATENED OR ABUSED PHYSICALLY, EMOTIONALLY, OR SEXUALLY BY ANYONE?: NO

## 2025-02-04 SDOH — SOCIAL STABILITY: SOCIAL INSECURITY: DO YOU FEEL UNSAFE GOING BACK TO THE PLACE WHERE YOU ARE LIVING?: NO

## 2025-02-04 SDOH — SOCIAL STABILITY: SOCIAL INSECURITY: WITHIN THE LAST YEAR, HAVE YOU BEEN AFRAID OF YOUR PARTNER OR EX-PARTNER?: PATIENT DECLINED

## 2025-02-04 SDOH — SOCIAL STABILITY: SOCIAL INSECURITY: HAVE YOU HAD ANY THOUGHTS OF HARMING ANYONE ELSE?: NO

## 2025-02-04 ASSESSMENT — PAIN SCALES - GENERAL
PAINLEVEL_OUTOF10: 7
PAINLEVEL_OUTOF10: 2
PAINLEVEL_OUTOF10: 8
PAINLEVEL_OUTOF10: 2
PAINLEVEL_OUTOF10: 0 - NO PAIN

## 2025-02-04 ASSESSMENT — ACTIVITIES OF DAILY LIVING (ADL)
JUDGMENT_ADEQUATE_SAFELY_COMPLETE_DAILY_ACTIVITIES: YES
DRESSING YOURSELF: INDEPENDENT
ADEQUATE_TO_COMPLETE_ADL: YES
TOILETING: INDEPENDENT
FEEDING YOURSELF: INDEPENDENT
HEARING - RIGHT EAR: FUNCTIONAL
LACK_OF_TRANSPORTATION: PATIENT DECLINED
HEARING - LEFT EAR: FUNCTIONAL
BATHING: INDEPENDENT
LACK_OF_TRANSPORTATION: PATIENT DECLINED
WALKS IN HOME: INDEPENDENT
GROOMING: INDEPENDENT
PATIENT'S MEMORY ADEQUATE TO SAFELY COMPLETE DAILY ACTIVITIES?: YES

## 2025-02-04 ASSESSMENT — COGNITIVE AND FUNCTIONAL STATUS - GENERAL
MOBILITY SCORE: 24
DAILY ACTIVITIY SCORE: 24
PATIENT BASELINE BEDBOUND: NO

## 2025-02-04 ASSESSMENT — LIFESTYLE VARIABLES
HOW MANY STANDARD DRINKS CONTAINING ALCOHOL DO YOU HAVE ON A TYPICAL DAY: PATIENT DOES NOT DRINK
HOW OFTEN DO YOU HAVE A DRINK CONTAINING ALCOHOL: NEVER
AUDIT-C TOTAL SCORE: 0
HOW OFTEN DO YOU HAVE 6 OR MORE DRINKS ON ONE OCCASION: NEVER
AUDIT-C TOTAL SCORE: 0
SKIP TO QUESTIONS 9-10: 1

## 2025-02-04 ASSESSMENT — PATIENT HEALTH QUESTIONNAIRE - PHQ9
SUM OF ALL RESPONSES TO PHQ9 QUESTIONS 1 & 2: 0
2. FEELING DOWN, DEPRESSED OR HOPELESS: NOT AT ALL
1. LITTLE INTEREST OR PLEASURE IN DOING THINGS: NOT AT ALL

## 2025-02-04 ASSESSMENT — COLUMBIA-SUICIDE SEVERITY RATING SCALE - C-SSRS
6. HAVE YOU EVER DONE ANYTHING, STARTED TO DO ANYTHING, OR PREPARED TO DO ANYTHING TO END YOUR LIFE?: NO
1. IN THE PAST MONTH, HAVE YOU WISHED YOU WERE DEAD OR WISHED YOU COULD GO TO SLEEP AND NOT WAKE UP?: NO
2. HAVE YOU ACTUALLY HAD ANY THOUGHTS OF KILLING YOURSELF?: NO

## 2025-02-04 NOTE — CONSULTS
Consults    Reason For Consult  Urinary retention    History Of Present Illness  Kayla Estes is a 84 y.o. female presenting with abdominal pain yesterday, CT demonstrates small bowel obstruction and/or enteritis. Seen by general surgery, decision to hold off on NG as no nausea/vomiting, and she is found to have a UTI which could be contributing to her SBO/enteritis. No acute surgial intervention planned. Overnight she c/o feeling need to void, but she could not. Bladder scan showed greater than 800 ml.  She was assisted to [facility] where she did pass some urine, though PVR still showed > 500cc she was straight cathed with actual PVR around 650cc A christianson was not placed. She has never had any retention issues before, in fact just the opposite, she sts she urinates frequently prior to this episode.     Past Medical History  She has a past medical history of Acid reflux, Cancer (Multi), and Hypertension.    Surgical History  She has no past surgical history on file.     Social History  She reports that she has never smoked. She does not have any smokeless tobacco history on file. She reports that she does not drink alcohol and does not use drugs.    Family History  No family history on file.     Allergies  Patient has no known allergies.    Review of Systems  Abd pain, Bladder discomfort     Physical Exam  Alert, NAD, anxious 85 yo female  Lungs are clear Bilat, Heart has RRR  Abdomen is soft, bladder is presently decompressed. She does exhibit tenderness in both lower quadrants without rebound or guarding. Faint BS can be ascultated.       Last Recorded Vitals  /58   Pulse 92   Temp 36.9 °C (98.4 °F) (Temporal)   Resp (!) 21   Wt 56.2 kg (124 lb)   SpO2 96%     Relevant Results  Acute Urinary Retention  UTI  SBO vs enteritis     Assessment/Plan     Will discuss with Dr Browne.    Bebeto Navarrete PA-C      Patient discussed with physician assistant.  Maintain Christianson until other acute issues resolved and  then give trial of void.

## 2025-02-04 NOTE — PROGRESS NOTES
Kayla Estes is a 84 y.o. female on day 0 of admission presenting with Small bowel obstruction (Multi).      Subjective   No vomiting overnight.  Had some urinary retention which was causing quite a lot of discomfort and required straight catheterization but after that she felt better and voided on her own but not completely emptying herself.  Has not been passing any gas and no bowel movement yet       Objective     Last Recorded Vitals  /63   Pulse 86   Temp 36.9 °C (98.4 °F) (Temporal)   Resp 12   Wt 56.2 kg (124 lb)   SpO2 (!) 92%   Intake/Output last 3 Shifts:    Intake/Output Summary (Last 24 hours) at 2/4/2025 0911  Last data filed at 2/4/2025 0857  Gross per 24 hour   Intake 50 ml   Output 989 ml   Net -939 ml       Admission Weight  Weight: 56.2 kg (124 lb) (02/03/25 0733)    Daily Weight  02/03/25 : 56.2 kg (124 lb)      Physical Exam:  General: Not in acute distress, alert.  On room air  HEENT: PERRLA, head intact and normocephalic  Neck: Normal to inspection  Lungs: Clear to auscultation, work of breathing within normal limit  Cardiac: Regular rate and rhythm  Abdomen: Soft some tenderness and discomfort on palpation.  Diminished bowel sounds  : Exam deferred  Skin: Intact  Hematology: No petechia or excessive ecchymosis  Musculoskeletal: Without significant trauma  Neurological: Alert awake oriented, no focal deficit, cranial nerves grossly intact  Psych: No suicidal ideation or homicidal ideation    Relevant Results  Scheduled medications  cefTRIAXone, 1 g, intravenous, Daily  enoxaparin, 40 mg, subcutaneous, Daily  fluticasone, 2 spray, Each Nostril, Nightly  pantoprazole, 40 mg, intravenous, Daily      Continuous medications  dextrose 5%-0.45 % sodium chloride, 75 mL/hr, Last Rate: 75 mL/hr (02/04/25 0140)      PRN medications  PRN medications: HYDROmorphone, ondansetron   Labs  Results from last 7 days   Lab Units 02/04/25  0748 02/03/25  0746   WBC AUTO x10*3/uL 7.7 12.6*    HEMOGLOBIN g/dL 8.4* 10.4*   HEMATOCRIT % 27.5* 32.6*   PLATELETS AUTO x10*3/uL 335 391     Results from last 7 days   Lab Units 02/03/25  0746   SODIUM mmol/L 136   POTASSIUM mmol/L 4.3   CHLORIDE mmol/L 101   CO2 mmol/L 24   BUN mg/dL 13   CREATININE mg/dL 0.69   CALCIUM mg/dL 9.4   PROTEIN TOTAL g/dL 7.2   BILIRUBIN TOTAL mg/dL 0.9   ALK PHOS U/L 53   ALT U/L 10   AST U/L 22   GLUCOSE mg/dL 105*       ECG 12 lead    Result Date: 2/3/2025  Normal sinus rhythm Nonspecific ST and T wave abnormality Abnormal ECG When compared with ECG of 21-MAR-2011 10:43, Vent. rate has increased BY  33 BPM Nonspecific T wave abnormality, worse in Inferior leads Nonspecific T wave abnormality now evident in Lateral leads    CT abdomen pelvis w IV contrast    Result Date: 2/3/2025  STUDY: CT Abdomen and Pelvis with IV Contrast; 2/3/2025 10:29 AM. INDICATION: Abdominal pain.  History of rectal cancer. COMPARISON: None Available. ACCESSION NUMBER(S): [id number] ORDERING CLINICIAN: RAFITA PINO TECHNIQUE: CT of the abdomen and pelvis was performed.  Contiguous axial images were obtained at 3 mm slice thickness through the abdomen and pelvis. Coronal and sagittal reconstructions at 3 mm slice thickness were performed.  Omnipaque 350, 70 mL was administered intravenously.  FINDINGS: LOWER CHEST: No cardiomegaly.  No pericardial effusion.  Lung bases are clear.  ABDOMEN:  LIVER: No hepatomegaly.  Smooth surface contour.  Normal attenuation. Subcentimeter hypodense focus in the right hepatic lobe on image 39 2 small to accurately characterize but statistically most likely benign.  No other focal hepatic lesions.   BILE DUCTS: No significant biliary duct dilatation.   GALLBLADDER: Distended gallbladder with tiny layering calcifications consistent with cholelithiasis.  No pericholecystic inflammatory changes.  No CT evidence of choledocholithiasis. STOMACH: Small hiatal hernia.  PANCREAS: Pancreas is unremarkable.  SPLEEN: Tiny  "low-density focus in the superior aspect of the spleen on image 18 is statistically most likely benign.  It is too small to characterize.  No splenomegaly.  ADRENAL GLANDS: Adrenal glands are normal.  KIDNEYS AND URETERS: Kidneys are normal in size and location.  Small cortical cyst upper pole left kidney.  No suspicious renal mass.  No nephrolithiasis or hydronephrosis.  No ureteral stone.  PELVIS:  BLADDER: Distended urinary bladder with area of asymmetric plaque-like wall thickening along the right lateral aspect measuring 5 mm in thickness see axial image 106.  Neoplastic disease is not excluded.  There is pelvic floor laxity.  Small amount of air within the urinary bladder anteriorly presumably from recent catheterization.  No free fluid or hematoma in the pelvis.  REPRODUCTIVE ORGANS: Atrophic uterus.  No adnexal mass.  BOWEL: Duodenal diverticulum noted. Small bowel obstruction with transition in the right lower quadrant.  Proximal to the obstruction there is fecal-like material within the small bowel (\"small bowel feces sign\") axial images 86-92.  There are areas of small bowel wall thickening and increased enhancement consistent with enteritis in multiple locations particularly distal to the transition point as well as multiple areas proximal to the main transition point.  Dilated and fluid-filled small bowel loops proximal to the transition consistent with obstruction.  Trace mesenteric edema central mesentery of the small bowel.  The mesenteric vessels are patent.  No volvulus is evident.  No pneumatosis or free air.  The colon is nondilated.  No acute pathology of the colon.  The appendix is not identified.   VESSELS: Calcific plaque of the walls of the abdominal aorta without focal stenosis, occlusion, dissection, or aneurysm.  Iliac arteries are patent without aneurysm.  Dilated blood vessels are noted within the left inguinal canal.   PERITONEUM/RETROPERITONEUM/LYMPH NODES: Minimal free fluid superior " to the liver and in the right lower quadrant.  No rim-enhancing or drainable collection.  No pneumoperitoneum. Lymph nodes in the small bowel mesentery somewhat conspicuous in overall number more than size, but one of the larger measuring 1 cm short axis image 62.  These are nonspecific.  No calcified mesenteric masses.  ABDOMINAL WALL: Intact SOFT TISSUES: No acute findings  BONES: Posterior fusion hardware at L4-L5 with pedicle screw and sara internal fixation and interbody spacer device.  No compression deformities of the visualized spine.  No acute fracture or aggressive osseous lesion.    Moderate to high-grade small bowel obstruction with transition in the right lower quadrant likely secondary to enteritis and/or multifocal areas of stenosis/strictures of the small bowel associated with enteritis.  No free air or abscess.  The small bowel is viable. Plaque-like wall thickening of the right lateral urinary bladder. Neoplastic disease is suspected.  Urology follow-up recommended for cystoscopy evaluation. Minimal ascites. Cholelithiasis. Hiatal hernia. Signed by Seven Best,                     Assessment/Plan   Kayla Estes is a 84 y.o. female on day 0 of admission presenting with Small bowel obstruction (Multi).  Assessment & Plan  Small bowel obstruction (Multi)    84-year-old female with past medical history of anal cancer status post chemotherapy and radiation, GERD, hypertension presented to emergency department for abdominal pain on 2/3/2025 and was found to have moderate to high-grade SBO with a transition point in right lower quadrant with multifocal area of stenosis/stricture.    Moderate to high-grade SBO versus ileus/enteritis from UTI  Clinically patient is not vomiting and abdominal pain is also improved  Not yet passing gas or having bowel movement  Will hold off on NG tube unless patient starts vomiting  Ambulate as tolerated  Correct electrolytes  Avoid narcotics  Appreciate GI and  general surgery input  IV PPI daily while patient is n.p.o.  IV fluids with dextrose,    Nitrite positive UTI present on admission  Continue with ceftriaxone  Follow-up culture result and change antibiotics as needed    History of anal cancer  Status post chemo and radiation  Plaque-like wall thickening of the right lateral urinary bladder is noted with neoplastic disease is suspected  Urology is consulted    Plaque-like wall thickening of the right lateral urinary bladder plus urinary retention  Urology is consulted  CT scan recommended possible cystoscopy for evaluation  Straight cath as needed  If patient continues to have urinary retention issue, will need Cabezas catheter    GERD  IV PPI daily    DVT prophylaxis  Ambulation and SCDs     Plan discussed with patient at bedside in ED bed 21    High level of MDM based on above issue and discussing plan    This note is created using voice recognition software. All efforts are made to minimize errors, if there are errors there due to transcription.    Yoni Kirkland  Hospitalist

## 2025-02-04 NOTE — CARE PLAN
The patient's goals for the shift include      The clinical goals for the shift include no abd pain

## 2025-02-04 NOTE — PROGRESS NOTES
02/04/25 1407   Discharge Planning   Living Arrangements Spouse/significant other   Support Systems Spouse/significant other   Type of Residence Private residence   Home or Post Acute Services None   Expected Discharge Disposition Home     Met with patient at bedside. Admitted for SBO. Pt lives with spouse and was independent PTA with no HHC or DME. PCP is Dr Jimenez. Pt feels she is able to manage her health and understands her medications. Was able to drive and obtain meds. Pt plans to return home with no new discharge needs. Family will provide transport.

## 2025-02-04 NOTE — PROGRESS NOTES
"Subjective  Patient sitting up in bed, more comfortable today with  at bedside.  She continues to have abdominal discomfort though not the burning sensation as before. Surgery and urology following.    Objective  Blood pressure 142/69, pulse 86, temperature 36.7 °C (98.1 °F), temperature source Tympanic, resp. rate 16, height 1.626 m (5' 4.02\"), weight 56.2 kg (123 lb 14.4 oz), SpO2 96%.    Physical Exam  Constitutional: Alert, pleasant and interactive, in NAD,  at bedside  Eyes: PERRL, sclera clear, no conjunctival injection  Skin: Warm and dry, no rash or ecchymosis  ENMT: Mucous membranes moist, no lesions noted  Resp: CTAB, even and unlabored  CV: RRR, normal S1, S2, no m,r,g  GI: +BS, soft, round, mild diffuse TTP, no rebound tenderness or guarding, no palpable masses or organomegaly  Extremities: Extremities warm, no edema, contusions, wounds or cyanosis  Neuro: Alert and oriented x3  Psych: Appropriate mood and behavior    Medications  Scheduled medications  cefTRIAXone, 1 g, intravenous, Daily  enoxaparin, 40 mg, subcutaneous, Daily  fluticasone, 2 spray, Each Nostril, Nightly  pantoprazole, 40 mg, intravenous, Daily      Continuous medications  dextrose 5%-0.45 % sodium chloride, 75 mL/hr, Last Rate: 75 mL/hr (02/04/25 0140)      PRN medications  PRN medications: HYDROmorphone, ondansetron     Labs  Lab Results   Component Value Date    WBC 7.7 02/04/2025    HGB 8.4 (L) 02/04/2025    HCT 27.5 (L) 02/04/2025    MCV 81 02/04/2025     02/04/2025     Lab Results   Component Value Date    GLUCOSE 100 (H) 02/04/2025    CALCIUM 8.0 (L) 02/04/2025     (L) 02/04/2025    K 3.2 (L) 02/04/2025    CO2 25 02/04/2025     02/04/2025    BUN 11 02/04/2025    CREATININE 0.63 02/04/2025     Lab Results   Component Value Date    ALT 10 02/03/2025    AST 22 02/03/2025    ALKPHOS 53 02/03/2025    BILITOT 0.9 02/03/2025     No results found for: \"IRON\", \"TIBC\", \"FERRITIN\"  No results found for: " "\"INR\", \"PROTIME\"      Radiology  CT A/P with IV contrast 2/3/2025 noting:  IMPRESSION:  Moderate to high-grade small bowel obstruction with transition in the  right lower quadrant likely secondary to enteritis and/or multifocal  areas of stenosis/strictures of the small bowel associated with  enteritis.  No free air or abscess.  The small bowel is viable.  Plaque-like wall thickening of the right lateral urinary bladder.   Neoplastic disease is suspected.  Urology follow-up recommended for  cystoscopy evaluation.  Minimal ascites.  Cholelithiasis.  Hiatal hernia.  Signed by Seven Best DO     Assessment:  Kayla Estes is a 84 y.o. female with PMH of anal CA s/p chemotherapy and radiation presenting with abdominal pain and nausea.  Pain came on suddenly at 4 AM.  She had nausea without vomiting but no further nausea at this time.  CT noting high-grade SBO with transition in RLQ with multifocal areas of stenosis/strictures, no free air or abscess.  Plaque-like thickening in the urinary bladder with neoplastic disease suspected.  Patient follows with Dr. Martínez for outpatient GI care with most recent flex sigmoidoscopy 1/10/2024 with normal findings.  Surgery and urology also on consult.    Patient more comfortable today with improving abdominal discomfort.  Patient denies passing flatus or having bowel movements.  No nausea or vomiting.     # acute abdominal pain  # high grade SBO v  gastroenteritis on CT with areas of stenosis/strictures  # plaque like bladder thickening with c/f neoplastic disease  # leukocytosis- resolved  # chronic constipation     Plan:  - continue supportive care  - defer to surgery for diet  - follow up surgery recs for SBO  - when able to have liquid diet, pt should start miralax BID  - continue analgesics and antiemetics as needed  - pt will continue to follow up with Dr. Martínez in clinic for GI needs  - no current indication for urgent inpatient endoscopy     Plan has been " discussed with Dr. So. GI will sign off.      Gavi Alston, APRN/CNP

## 2025-02-05 LAB
ANION GAP SERPL CALC-SCNC: 13 MMOL/L (ref 10–20)
BACTERIA UR CULT: ABNORMAL
BASOPHILS # BLD AUTO: 0.03 X10*3/UL (ref 0–0.1)
BASOPHILS NFR BLD AUTO: 0.6 %
BUN SERPL-MCNC: 8 MG/DL (ref 6–23)
CALCIUM SERPL-MCNC: 8.1 MG/DL (ref 8.6–10.3)
CHLORIDE SERPL-SCNC: 104 MMOL/L (ref 98–107)
CO2 SERPL-SCNC: 21 MMOL/L (ref 21–32)
CREAT SERPL-MCNC: 0.56 MG/DL (ref 0.5–1.05)
EGFRCR SERPLBLD CKD-EPI 2021: 90 ML/MIN/1.73M*2
EOSINOPHIL # BLD AUTO: 0.19 X10*3/UL (ref 0–0.4)
EOSINOPHIL NFR BLD AUTO: 3.9 %
ERYTHROCYTE [DISTWIDTH] IN BLOOD BY AUTOMATED COUNT: 17.9 % (ref 11.5–14.5)
GLUCOSE SERPL-MCNC: 107 MG/DL (ref 74–99)
HCT VFR BLD AUTO: 27.3 % (ref 36–46)
HGB BLD-MCNC: 8.4 G/DL (ref 12–16)
IMM GRANULOCYTES # BLD AUTO: 0.01 X10*3/UL (ref 0–0.5)
IMM GRANULOCYTES NFR BLD AUTO: 0.2 % (ref 0–0.9)
LYMPHOCYTES # BLD AUTO: 1.49 X10*3/UL (ref 0.8–3)
LYMPHOCYTES NFR BLD AUTO: 30.8 %
MAGNESIUM SERPL-MCNC: 1.77 MG/DL (ref 1.6–2.4)
MCH RBC QN AUTO: 24.7 PG (ref 26–34)
MCHC RBC AUTO-ENTMCNC: 30.8 G/DL (ref 32–36)
MCV RBC AUTO: 80 FL (ref 80–100)
MONOCYTES # BLD AUTO: 0.56 X10*3/UL (ref 0.05–0.8)
MONOCYTES NFR BLD AUTO: 11.6 %
NEUTROPHILS # BLD AUTO: 2.56 X10*3/UL (ref 1.6–5.5)
NEUTROPHILS NFR BLD AUTO: 52.9 %
NRBC BLD-RTO: 0 /100 WBCS (ref 0–0)
PLATELET # BLD AUTO: 335 X10*3/UL (ref 150–450)
POTASSIUM SERPL-SCNC: 2.8 MMOL/L (ref 3.5–5.3)
RBC # BLD AUTO: 3.4 X10*6/UL (ref 4–5.2)
SODIUM SERPL-SCNC: 135 MMOL/L (ref 136–145)
WBC # BLD AUTO: 4.8 X10*3/UL (ref 4.4–11.3)

## 2025-02-05 PROCEDURE — 2500000001 HC RX 250 WO HCPCS SELF ADMINISTERED DRUGS (ALT 637 FOR MEDICARE OP): Performed by: INTERNAL MEDICINE

## 2025-02-05 PROCEDURE — 99233 SBSQ HOSP IP/OBS HIGH 50: CPT | Performed by: INTERNAL MEDICINE

## 2025-02-05 PROCEDURE — 2500000004 HC RX 250 GENERAL PHARMACY W/ HCPCS (ALT 636 FOR OP/ED): Performed by: INTERNAL MEDICINE

## 2025-02-05 PROCEDURE — 83735 ASSAY OF MAGNESIUM: CPT | Performed by: INTERNAL MEDICINE

## 2025-02-05 PROCEDURE — 2500000002 HC RX 250 W HCPCS SELF ADMINISTERED DRUGS (ALT 637 FOR MEDICARE OP, ALT 636 FOR OP/ED): Performed by: NURSE PRACTITIONER

## 2025-02-05 PROCEDURE — 51701 INSERT BLADDER CATHETER: CPT

## 2025-02-05 PROCEDURE — 99232 SBSQ HOSP IP/OBS MODERATE 35: CPT | Performed by: NURSE PRACTITIONER

## 2025-02-05 PROCEDURE — 1100000001 HC PRIVATE ROOM DAILY

## 2025-02-05 PROCEDURE — 36415 COLL VENOUS BLD VENIPUNCTURE: CPT | Performed by: INTERNAL MEDICINE

## 2025-02-05 PROCEDURE — 85025 COMPLETE CBC W/AUTO DIFF WBC: CPT | Performed by: INTERNAL MEDICINE

## 2025-02-05 PROCEDURE — RXMED WILLOW AMBULATORY MEDICATION CHARGE

## 2025-02-05 PROCEDURE — 2500000001 HC RX 250 WO HCPCS SELF ADMINISTERED DRUGS (ALT 637 FOR MEDICARE OP): Performed by: NURSE PRACTITIONER

## 2025-02-05 PROCEDURE — 99233 SBSQ HOSP IP/OBS HIGH 50: CPT | Performed by: SURGERY

## 2025-02-05 PROCEDURE — 99239 HOSP IP/OBS DSCHRG MGMT >30: CPT | Performed by: INTERNAL MEDICINE

## 2025-02-05 PROCEDURE — 2500000002 HC RX 250 W HCPCS SELF ADMINISTERED DRUGS (ALT 637 FOR MEDICARE OP, ALT 636 FOR OP/ED): Performed by: INTERNAL MEDICINE

## 2025-02-05 PROCEDURE — 80048 BASIC METABOLIC PNL TOTAL CA: CPT | Performed by: INTERNAL MEDICINE

## 2025-02-05 RX ORDER — TAMSULOSIN HYDROCHLORIDE 0.4 MG/1
0.4 CAPSULE ORAL DAILY
Qty: 30 CAPSULE | Refills: 0 | Status: SHIPPED | OUTPATIENT
Start: 2025-02-06 | End: 2025-03-08

## 2025-02-05 RX ORDER — BETHANECHOL CHLORIDE 25 MG/1
25 TABLET ORAL 3 TIMES DAILY
Status: DISCONTINUED | OUTPATIENT
Start: 2025-02-05 | End: 2025-02-06 | Stop reason: HOSPADM

## 2025-02-05 RX ORDER — CEPHALEXIN 500 MG/1
500 CAPSULE ORAL 3 TIMES DAILY
Qty: 15 CAPSULE | Refills: 0 | Status: SHIPPED | OUTPATIENT
Start: 2025-02-05 | End: 2025-02-11

## 2025-02-05 RX ORDER — MAGNESIUM SULFATE HEPTAHYDRATE 40 MG/ML
2 INJECTION, SOLUTION INTRAVENOUS ONCE
Status: COMPLETED | OUTPATIENT
Start: 2025-02-05 | End: 2025-02-05

## 2025-02-05 RX ORDER — POTASSIUM CHLORIDE 14.9 MG/ML
20 INJECTION INTRAVENOUS
Status: COMPLETED | OUTPATIENT
Start: 2025-02-05 | End: 2025-02-05

## 2025-02-05 RX ORDER — POTASSIUM CHLORIDE 20 MEQ/1
40 TABLET, EXTENDED RELEASE ORAL ONCE
Status: COMPLETED | OUTPATIENT
Start: 2025-02-05 | End: 2025-02-05

## 2025-02-05 RX ORDER — ACETAMINOPHEN 325 MG/1
650 TABLET ORAL EVERY 6 HOURS PRN
Status: DISCONTINUED | OUTPATIENT
Start: 2025-02-05 | End: 2025-02-06 | Stop reason: HOSPADM

## 2025-02-05 RX ORDER — TAMSULOSIN HYDROCHLORIDE 0.4 MG/1
0.4 CAPSULE ORAL DAILY
Status: DISCONTINUED | OUTPATIENT
Start: 2025-02-05 | End: 2025-02-06 | Stop reason: HOSPADM

## 2025-02-05 RX ADMIN — POTASSIUM CHLORIDE 20 MEQ: 14.9 INJECTION, SOLUTION INTRAVENOUS at 11:46

## 2025-02-05 RX ADMIN — BETHANECHOL CHLORIDE 25 MG: 25 TABLET ORAL at 20:41

## 2025-02-05 RX ADMIN — ENOXAPARIN SODIUM 40 MG: 40 INJECTION SUBCUTANEOUS at 08:57

## 2025-02-05 RX ADMIN — FLUTICASONE PROPIONATE 2 SPRAY: 50 SPRAY, METERED NASAL at 20:41

## 2025-02-05 RX ADMIN — BETHANECHOL CHLORIDE 25 MG: 25 TABLET ORAL at 09:26

## 2025-02-05 RX ADMIN — CEFTRIAXONE SODIUM 1 G: 1 INJECTION, SOLUTION INTRAVENOUS at 08:54

## 2025-02-05 RX ADMIN — TAMSULOSIN HYDROCHLORIDE 0.4 MG: 0.4 CAPSULE ORAL at 09:26

## 2025-02-05 RX ADMIN — MAGNESIUM SULFATE HEPTAHYDRATE 2 G: 40 INJECTION, SOLUTION INTRAVENOUS at 08:55

## 2025-02-05 RX ADMIN — BETHANECHOL CHLORIDE 25 MG: 25 TABLET ORAL at 15:15

## 2025-02-05 RX ADMIN — POTASSIUM CHLORIDE 40 MEQ: 1500 TABLET, EXTENDED RELEASE ORAL at 08:57

## 2025-02-05 RX ADMIN — PSYLLIUM HUSK 1 PACKET: 3.4 POWDER ORAL at 20:41

## 2025-02-05 RX ADMIN — ACETAMINOPHEN 650 MG: 325 TABLET ORAL at 14:16

## 2025-02-05 RX ADMIN — PSYLLIUM HUSK 1 PACKET: 3.4 POWDER ORAL at 12:09

## 2025-02-05 RX ADMIN — PSYLLIUM HUSK 1 PACKET: 3.4 POWDER ORAL at 15:15

## 2025-02-05 RX ADMIN — POTASSIUM CHLORIDE 20 MEQ: 14.9 INJECTION, SOLUTION INTRAVENOUS at 08:54

## 2025-02-05 ASSESSMENT — COGNITIVE AND FUNCTIONAL STATUS - GENERAL
DAILY ACTIVITIY SCORE: 24
MOBILITY SCORE: 24

## 2025-02-05 ASSESSMENT — PAIN SCALES - GENERAL
PAINLEVEL_OUTOF10: 0 - NO PAIN
PAINLEVEL_OUTOF10: 1
PAINLEVEL_OUTOF10: 1
PAINLEVEL_OUTOF10: 3

## 2025-02-05 ASSESSMENT — PAIN DESCRIPTION - LOCATION: LOCATION: NECK

## 2025-02-05 ASSESSMENT — PAIN - FUNCTIONAL ASSESSMENT: PAIN_FUNCTIONAL_ASSESSMENT: 0-10

## 2025-02-05 NOTE — PROGRESS NOTES
Kayla Estes 84 y.o. female    Subjective  Patient seen and examined this morning.  No fever or chills.  Reports having pressure in suprapubic area, unable to spontaneously void.  She was straight cathed overnight for almost 700cc of urine.  Bladder scanned for almost 300cc of urine this morning.  Patient minimally ambulatory. Denies CP and SOB.  No acute events overnight.        Objective  PHYSICAL EXAM:  Physical Exam  Vitals reviewed.   Constitutional:       General: She is awake.      Appearance: Normal appearance.   Cardiovascular:      Rate and Rhythm: Normal rate and regular rhythm.      Pulses: Normal pulses.      Heart sounds: Normal heart sounds.   Pulmonary:      Effort: Pulmonary effort is normal.      Breath sounds: Normal breath sounds and air entry.   Abdominal:      General: Abdomen is flat. There is no distension.      Palpations: Abdomen is soft.      Tenderness: There is abdominal tenderness (suprapubic pressure).   Musculoskeletal:         General: Normal range of motion.      Cervical back: Normal range of motion.   Skin:     General: Skin is warm and dry.   Neurological:      General: No focal deficit present.      Mental Status: She is alert and oriented to person, place, and time.   Psychiatric:         Behavior: Behavior is cooperative.         Vital signs in last 24 hours:  Vitals:    02/05/25 0800   BP: 118/65   Pulse: 83   Resp: 16   Temp: 37.1 °C (98.8 °F)   SpO2: 98%         Intake/Output this shift:    Intake/Output Summary (Last 24 hours) at 2/5/2025 0912  Last data filed at 2/5/2025 0134  Gross per 24 hour   Intake 1972.5 ml   Output 1722 ml   Net 250.5 ml        Allergies:  No Known Allergies     Medications:  Scheduled medications  bethanechol, 25 mg, oral, TID  cefTRIAXone, 1 g, intravenous, Daily  enoxaparin, 40 mg, subcutaneous, Daily  fluticasone, 2 spray, Each Nostril, Nightly  magnesium sulfate, 2 g, intravenous, Once  pantoprazole, 40 mg, intravenous, Daily  potassium  chloride, 20 mEq, intravenous, q2h  tamsulosin, 0.4 mg, oral, Daily      Continuous medications  dextrose 5%-0.45 % sodium chloride, 75 mL/hr, Last Rate: 75 mL/hr (02/04/25 2237)      PRN medications  PRN medications: HYDROmorphone, ondansetron       Labs:  Results for orders placed or performed during the hospital encounter of 02/03/25 (from the past 24 hours)   Basic Metabolic Panel   Result Value Ref Range    Glucose 107 (H) 74 - 99 mg/dL    Sodium 135 (L) 136 - 145 mmol/L    Potassium 2.8 (LL) 3.5 - 5.3 mmol/L    Chloride 104 98 - 107 mmol/L    Bicarbonate 21 21 - 32 mmol/L    Anion Gap 13 10 - 20 mmol/L    Urea Nitrogen 8 6 - 23 mg/dL    Creatinine 0.56 0.50 - 1.05 mg/dL    eGFR 90 >60 mL/min/1.73m*2    Calcium 8.1 (L) 8.6 - 10.3 mg/dL   CBC and Auto Differential   Result Value Ref Range    WBC 4.8 4.4 - 11.3 x10*3/uL    nRBC 0.0 0.0 - 0.0 /100 WBCs    RBC 3.40 (L) 4.00 - 5.20 x10*6/uL    Hemoglobin 8.4 (L) 12.0 - 16.0 g/dL    Hematocrit 27.3 (L) 36.0 - 46.0 %    MCV 80 80 - 100 fL    MCH 24.7 (L) 26.0 - 34.0 pg    MCHC 30.8 (L) 32.0 - 36.0 g/dL    RDW 17.9 (H) 11.5 - 14.5 %    Platelets 335 150 - 450 x10*3/uL    Neutrophils % 52.9 40.0 - 80.0 %    Immature Granulocytes %, Automated 0.2 0.0 - 0.9 %    Lymphocytes % 30.8 13.0 - 44.0 %    Monocytes % 11.6 2.0 - 10.0 %    Eosinophils % 3.9 0.0 - 6.0 %    Basophils % 0.6 0.0 - 2.0 %    Neutrophils Absolute 2.56 1.60 - 5.50 x10*3/uL    Immature Granulocytes Absolute, Automated 0.01 0.00 - 0.50 x10*3/uL    Lymphocytes Absolute 1.49 0.80 - 3.00 x10*3/uL    Monocytes Absolute 0.56 0.05 - 0.80 x10*3/uL    Eosinophils Absolute 0.19 0.00 - 0.40 x10*3/uL    Basophils Absolute 0.03 0.00 - 0.10 x10*3/uL   Magnesium   Result Value Ref Range    Magnesium 1.77 1.60 - 2.40 mg/dL        Imaging:  ECG 12 lead    Result Date: 2/3/2025  Normal sinus rhythm Nonspecific ST and T wave abnormality Abnormal ECG When compared with ECG of 21-MAR-2011 10:43, Vent. rate has increased BY  33  BPM Nonspecific T wave abnormality, worse in Inferior leads Nonspecific T wave abnormality now evident in Lateral leads    CT abdomen pelvis w IV contrast    Result Date: 2/3/2025  STUDY: CT Abdomen and Pelvis with IV Contrast; 2/3/2025 10:29 AM. INDICATION: Abdominal pain.  History of rectal cancer. COMPARISON: None Available. ACCESSION NUMBER(S): [id number] ORDERING CLINICIAN: RAFITA PINO TECHNIQUE: CT of the abdomen and pelvis was performed.  Contiguous axial images were obtained at 3 mm slice thickness through the abdomen and pelvis. Coronal and sagittal reconstructions at 3 mm slice thickness were performed.  Omnipaque 350, 70 mL was administered intravenously.  FINDINGS: LOWER CHEST: No cardiomegaly.  No pericardial effusion.  Lung bases are clear.  ABDOMEN:  LIVER: No hepatomegaly.  Smooth surface contour.  Normal attenuation. Subcentimeter hypodense focus in the right hepatic lobe on image 39 2 small to accurately characterize but statistically most likely benign.  No other focal hepatic lesions.   BILE DUCTS: No significant biliary duct dilatation.   GALLBLADDER: Distended gallbladder with tiny layering calcifications consistent with cholelithiasis.  No pericholecystic inflammatory changes.  No CT evidence of choledocholithiasis. STOMACH: Small hiatal hernia.  PANCREAS: Pancreas is unremarkable.  SPLEEN: Tiny low-density focus in the superior aspect of the spleen on image 18 is statistically most likely benign.  It is too small to characterize.  No splenomegaly.  ADRENAL GLANDS: Adrenal glands are normal.  KIDNEYS AND URETERS: Kidneys are normal in size and location.  Small cortical cyst upper pole left kidney.  No suspicious renal mass.  No nephrolithiasis or hydronephrosis.  No ureteral stone.  PELVIS:  BLADDER: Distended urinary bladder with area of asymmetric plaque-like wall thickening along the right lateral aspect measuring 5 mm in thickness see axial image 106.  Neoplastic disease is not  "excluded.  There is pelvic floor laxity.  Small amount of air within the urinary bladder anteriorly presumably from recent catheterization.  No free fluid or hematoma in the pelvis.  REPRODUCTIVE ORGANS: Atrophic uterus.  No adnexal mass.  BOWEL: Duodenal diverticulum noted. Small bowel obstruction with transition in the right lower quadrant.  Proximal to the obstruction there is fecal-like material within the small bowel (\"small bowel feces sign\") axial images 86-92.  There are areas of small bowel wall thickening and increased enhancement consistent with enteritis in multiple locations particularly distal to the transition point as well as multiple areas proximal to the main transition point.  Dilated and fluid-filled small bowel loops proximal to the transition consistent with obstruction.  Trace mesenteric edema central mesentery of the small bowel.  The mesenteric vessels are patent.  No volvulus is evident.  No pneumatosis or free air.  The colon is nondilated.  No acute pathology of the colon.  The appendix is not identified.   VESSELS: Calcific plaque of the walls of the abdominal aorta without focal stenosis, occlusion, dissection, or aneurysm.  Iliac arteries are patent without aneurysm.  Dilated blood vessels are noted within the left inguinal canal.   PERITONEUM/RETROPERITONEUM/LYMPH NODES: Minimal free fluid superior to the liver and in the right lower quadrant.  No rim-enhancing or drainable collection.  No pneumoperitoneum. Lymph nodes in the small bowel mesentery somewhat conspicuous in overall number more than size, but one of the larger measuring 1 cm short axis image 62.  These are nonspecific.  No calcified mesenteric masses.  ABDOMINAL WALL: Intact SOFT TISSUES: No acute findings  BONES: Posterior fusion hardware at L4-L5 with pedicle screw and sara internal fixation and interbody spacer device.  No compression deformities of the visualized spine.  No acute fracture or aggressive osseous " lesion.    Moderate to high-grade small bowel obstruction with transition in the right lower quadrant likely secondary to enteritis and/or multifocal areas of stenosis/strictures of the small bowel associated with enteritis.  No free air or abscess.  The small bowel is viable. Plaque-like wall thickening of the right lateral urinary bladder. Neoplastic disease is suspected.  Urology follow-up recommended for cystoscopy evaluation. Minimal ascites. Cholelithiasis. Hiatal hernia. Signed by Seven Best, DO           Plan    #Urinary Retention    - Bladder scanned several times and was straight cathed overnight for 700cc.  Bladder scanned this morning for almost 300cc of urine  - Start flomax 0.4mg daily  - Start urecholine 25mg TID  - Encourage ambulation  - If patient unable to void and bladder scan is >300cc, insert christianson catheter and will do TOV in next couple of days.       Plan of care discussed with Dr. Browne.     HAKEEM Teran-CNP    I spent 20 minutes in the professional and overall care of this patient.     Addendum:  Contacted nurse, patient was able to void 100cc spontaneously.  She was bladder scanned for >300cc with decision to place christianson catheter.  Patient has no urge to void on her own and is having difficult time starting a stream.

## 2025-02-05 NOTE — DISCHARGE SUMMARY
Discharge Diagnosis  Small bowel obstruction (Multi)    Issues Requiring Follow-Up  Follow-up with primary care provider and urology as outpatient    Discharge Meds     Medication List      START taking these medications     bethanechol 25 mg tablet; Commonly known as: Urecholine; Take 1 tablet   (25 mg) by mouth 3 times a day.   cephalexin 500 mg capsule; Commonly known as: Keflex; Take 1 capsule   (500 mg) by mouth 3 times a day for 5 days.   psyllium 3.4 gram packet; Commonly known as: Metamucil; Take 1 packet by   mouth 3 times a day.   tamsulosin 0.4 mg 24 hr capsule; Commonly known as: Flomax; Take 1   capsule (0.4 mg) by mouth once daily.     CONTINUE taking these medications     alendronate 70 mg tablet; Commonly known as: Fosamax   amitriptyline 25 mg tablet; Commonly known as: Elavil   amLODIPine 2.5 mg tablet; Commonly known as: Norvasc   cyclobenzaprine 5 mg tablet; Commonly known as: Flexeril   fluticasone 50 mcg/actuation nasal spray; Commonly known as: Flonase   losartan 100 mg tablet; Commonly known as: Cozaar   metoprolol succinate XL 50 mg 24 hr tablet; Commonly known as: Toprol-XL   naproxen 375 mg tablet; Commonly known as: Naprosyn   pantoprazole 40 mg EC tablet; Commonly known as: ProtoNix   pravastatin 40 mg tablet; Commonly known as: Pravachol       Test Results Pending At Discharge  Pending Labs       No current pending labs.            Hospital Course  84-year-old female with past medical history of anal cancer status post chemotherapy and radiation, GERD, hypertension presented to emergency department for abdominal pain on 2/3/2025 and was found to have moderate to high-grade SBO with a transition point in right lower quadrant with multifocal area of stenosis/stricture.  Patient was admitted with concerns for bowel obstruction but clinically she fits a picture of having ileus/impaction causing her abdominal discomfort.  Patient received few enemas resulting into successful bowel movement.   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  We talked about uptitrating her Metamucil and MiraLAX to achieve 1 soft bowel movement per day.  She was also found to have UTI with E. coli present on admission which is quite sensitive and we will switch her to Keflex.  Patient also had urinary retention requiring Cabeazs catheter placement and will be following up with urology as outpatient.    39 minutes spent in discharge timing    Pertinent Physical Exam At Time of Discharge  General: Not in acute distress, alert  HEENT: PERRLA, head intact and normocephalic  Neck: Normal to inspection  Lungs: Clear to auscultation, work of breathing within normal limit  Cardiac: Regular rate and rhythm  Abdomen: Soft nontender, positive bowel sounds  : Cabezas catheter in place  Skin: Intact  Hematology: No petechia or excessive ecchymosis  Musculoskeletal: Without significant trauma  Neurological: Alert awake oriented, repeating herself, slightly forgetful no focal deficit, cranial nerves grossly intact  Psych: No suicidal ideation or homicidal ideation    Outpatient Follow-Up  No future appointments.      Yoni Kirkland MD

## 2025-02-05 NOTE — HOSPITAL COURSE
84-year-old female with past medical history of anal cancer status post chemotherapy and radiation, GERD, hypertension presented to emergency department for abdominal pain on 2/3/2025 and was found to have moderate to high-grade SBO with a transition point in right lower quadrant with multifocal area of stenosis/stricture.  Patient was admitted with concerns for bowel obstruction but clinically she fits a picture of having ileus/impaction causing her abdominal discomfort.  Patient received few enemas resulting into successful bowel movement.  Patient reported that she has similar issue ongoing and works with [employer] where she goes 3 to 4 days without a bowel movement.  We talked about uptitrating her Metamucil and MiraLAX to achieve 1 soft bowel movement per day.  She was also found to have UTI with E. coli present on admission which is quite sensitive and we will switch her to Keflex.  Patient will follow-up with urology as outpatient for urinary retention and the recommended starting Flomax for her and if she is able to void she can be discharged home.    39 minutes spent in discharge timing

## 2025-02-05 NOTE — CARE PLAN
The patient's goals for the shift include        Problem: Skin  Goal: Participates in plan/prevention/treatment measures  Outcome: Progressing  Goal: Prevent/manage excess moisture  Outcome: Progressing  Goal: Prevent/minimize sheer/friction injuries  Outcome: Progressing  Goal: Promote/optimize nutrition  Outcome: Progressing  Goal: Promote skin healing  Outcome: Progressing     Problem: Fall/Injury  Goal: Not fall by end of shift  Outcome: Progressing  Goal: Be free from injury by end of the shift  Outcome: Progressing  Goal: Verbalize understanding of personal risk factors for fall in the hospital  Outcome: Progressing  Goal: Verbalize understanding of risk factor reduction measures to prevent injury from fall in the home  Outcome: Progressing     Problem: Pain  Goal: Takes deep breaths with improved pain control throughout the shift  Outcome: Progressing  Goal: Turns in bed with improved pain control throughout the shift  Outcome: Progressing  Goal: Walks with improved pain control throughout the shift  Outcome: Progressing  Goal: Performs ADL's with improved pain control throughout shift  Outcome: Progressing     Problem: Pain - Adult  Goal: Verbalizes/displays adequate comfort level or baseline comfort level  Outcome: Progressing     Problem: Discharge Planning  Goal: Discharge to home or other facility with appropriate resources  Outcome: Progressing     Problem: Chronic Conditions and Co-morbidities  Goal: Patient's chronic conditions and co-morbidity symptoms are monitored and maintained or improved  Outcome: Progressing     Problem: Nutrition  Goal: Nutrient intake appropriate for maintaining nutritional needs  Outcome: Progressing

## 2025-02-05 NOTE — CARE PLAN
The patient's goals for the shift include      The clinical goals for the shift include no abd pain      Problem: Skin  Goal: Participates in plan/prevention/treatment measures  Outcome: Progressing  Goal: Prevent/manage excess moisture  Outcome: Progressing  Goal: Prevent/minimize sheer/friction injuries  Outcome: Progressing  Goal: Promote/optimize nutrition  Outcome: Progressing  Goal: Promote skin healing  Outcome: Progressing     Problem: Fall/Injury  Goal: Not fall by end of shift  Outcome: Progressing  Goal: Be free from injury by end of the shift  Outcome: Progressing  Goal: Verbalize understanding of personal risk factors for fall in the hospital  Outcome: Progressing  Goal: Verbalize understanding of risk factor reduction measures to prevent injury from fall in the home  Outcome: Progressing     Problem: Pain  Goal: Takes deep breaths with improved pain control throughout the shift  Outcome: Progressing  Goal: Turns in bed with improved pain control throughout the shift  Outcome: Progressing  Goal: Walks with improved pain control throughout the shift  Outcome: Progressing  Goal: Performs ADL's with improved pain control throughout shift  Outcome: Progressing     Problem: Pain - Adult  Goal: Verbalizes/displays adequate comfort level or baseline comfort level  Outcome: Progressing     Problem: Safety - Adult  Goal: Free from fall injury  Outcome: Progressing     Problem: Discharge Planning  Goal: Discharge to home or other facility with appropriate resources  Outcome: Progressing     Problem: Chronic Conditions and Co-morbidities  Goal: Patient's chronic conditions and co-morbidity symptoms are monitored and maintained or improved  Outcome: Progressing     Problem: Nutrition  Goal: Nutrient intake appropriate for maintaining nutritional needs  Outcome: Progressing

## 2025-02-05 NOTE — PROGRESS NOTES
"General Surgery Daily Progress Note           Subjective:  Patient seen and examined at bedside this morning, she feels well, her abdomen is nice and soft and flat.  She has been passing gas and having bowel movements.  She denies nausea/vomiting.  She is retaining urine however and has needed to be straight cathed a few times.           Objective:    Physical Exam:   /65 (BP Location: Right arm, Patient Position: Lying)   Pulse 83   Temp 37.1 °C (98.8 °F) (Temporal)   Resp 16   Ht 1.626 m (5' 4.02\")   Wt 56.2 kg (123 lb 14.4 oz)   SpO2 98%   BMI 21.26 kg/m²     General: No acute distress. Sitting up in bed.   Neuro: Alert and oriented ×3. Follows commands.  Head: Atraumatic  Eyes: Pupils equal reactive to light. Extraocular motions intact.  Ears: Hears normal speaking voice.  Neck: Supple. No appreciable masses.  Heart: Regular rate  Lungs: No audible wheeze.  Breathing comfortably.  Abdomen: Soft. Nondistended.  Nontender.  Genitourinary: Normal urine on straight cath.  Musculoskeletal: Moves all extremities.  Normal range of motion.  Skin: No rashes or lesions.  Psychological: Normal affect              Labs:  This morning's labs shows some electrolyte derangements, hyponatremia and severe hypokalemia.  Patient is baseline anemic.  Leukocytosis resolved.           Images:  No new imaging.        Assessment:  This is an 84-year-old female with history of anal cancer treated with extensive radiation and chemotherapy, with frequent UTIs.  She presented to the ED 2/3/25 with abdominal bloating and was found to have small bowel enteritis/ileus along with a UTI.  The patient has not been vomiting, did not need an NGT to be placed.  Once antibiotics were started for UTI and patient got some resuscitation, she started having more bowel movements, passing flatus, and her abdomen felt softer than normal once again.  She has been having urinary retention, which they are managing with straight cathing.  There is " no further surgical needs at this time.  Patient may have a regular diet.     Plan:  --Care per primary  --Regular diet  --I will sign off at this time, please call/text with any questions/concerns.           Roman Hearn MD MPH  General Surgery  Office: [phone]  Fax: [phone]

## 2025-02-05 NOTE — NURSING NOTE
1500: Assumed care of pt     1800: Pt voided 100ml bladder scan post void 327ml. Per MD order 16f christianson placed, sterile procedure maintained.  325ml output. Pt tolerated well.    EOS Note: Pt has remained HDS this shift. Christianson is draining properly. Urine is straw colored. Denies any complaint of pain or discomfort. Call light and fluids are within reach.

## 2025-02-06 ENCOUNTER — PHARMACY VISIT (OUTPATIENT)
Dept: PHARMACY | Facility: CLINIC | Age: 85
End: 2025-02-06
Payer: MEDICARE

## 2025-02-06 VITALS
OXYGEN SATURATION: 97 % | SYSTOLIC BLOOD PRESSURE: 128 MMHG | WEIGHT: 123.9 LBS | HEIGHT: 64 IN | HEART RATE: 94 BPM | RESPIRATION RATE: 17 BRPM | DIASTOLIC BLOOD PRESSURE: 69 MMHG | TEMPERATURE: 97.9 F | BODY MASS INDEX: 21.15 KG/M2

## 2025-02-06 LAB
ANION GAP SERPL CALC-SCNC: 11 MMOL/L (ref 10–20)
BUN SERPL-MCNC: 5 MG/DL (ref 6–23)
CALCIUM SERPL-MCNC: 8.8 MG/DL (ref 8.6–10.3)
CHLORIDE SERPL-SCNC: 105 MMOL/L (ref 98–107)
CO2 SERPL-SCNC: 23 MMOL/L (ref 21–32)
CREAT SERPL-MCNC: 0.46 MG/DL (ref 0.5–1.05)
EGFRCR SERPLBLD CKD-EPI 2021: >90 ML/MIN/1.73M*2
GLUCOSE SERPL-MCNC: 96 MG/DL (ref 74–99)
MAGNESIUM SERPL-MCNC: 2.28 MG/DL (ref 1.6–2.4)
POTASSIUM SERPL-SCNC: 3.8 MMOL/L (ref 3.5–5.3)
SODIUM SERPL-SCNC: 135 MMOL/L (ref 136–145)

## 2025-02-06 PROCEDURE — 2500000002 HC RX 250 W HCPCS SELF ADMINISTERED DRUGS (ALT 637 FOR MEDICARE OP, ALT 636 FOR OP/ED): Performed by: NURSE PRACTITIONER

## 2025-02-06 PROCEDURE — 2500000001 HC RX 250 WO HCPCS SELF ADMINISTERED DRUGS (ALT 637 FOR MEDICARE OP): Performed by: INTERNAL MEDICINE

## 2025-02-06 PROCEDURE — 2500000004 HC RX 250 GENERAL PHARMACY W/ HCPCS (ALT 636 FOR OP/ED): Performed by: INTERNAL MEDICINE

## 2025-02-06 PROCEDURE — 36415 COLL VENOUS BLD VENIPUNCTURE: CPT | Performed by: INTERNAL MEDICINE

## 2025-02-06 PROCEDURE — 2500000001 HC RX 250 WO HCPCS SELF ADMINISTERED DRUGS (ALT 637 FOR MEDICARE OP): Performed by: NURSE PRACTITIONER

## 2025-02-06 PROCEDURE — 51702 INSERT TEMP BLADDER CATH: CPT

## 2025-02-06 PROCEDURE — 83735 ASSAY OF MAGNESIUM: CPT | Performed by: INTERNAL MEDICINE

## 2025-02-06 PROCEDURE — 80048 BASIC METABOLIC PNL TOTAL CA: CPT | Performed by: INTERNAL MEDICINE

## 2025-02-06 PROCEDURE — 99232 SBSQ HOSP IP/OBS MODERATE 35: CPT | Performed by: NURSE PRACTITIONER

## 2025-02-06 PROCEDURE — 99239 HOSP IP/OBS DSCHRG MGMT >30: CPT | Performed by: INTERNAL MEDICINE

## 2025-02-06 RX ORDER — BETHANECHOL CHLORIDE 25 MG/1
25 TABLET ORAL 3 TIMES DAILY
Qty: 90 TABLET | Refills: 0 | Status: SHIPPED | OUTPATIENT
Start: 2025-02-06 | End: 2025-02-06 | Stop reason: HOSPADM

## 2025-02-06 RX ORDER — BETHANECHOL CHLORIDE 25 MG/1
25 TABLET ORAL 3 TIMES DAILY
Qty: 90 TABLET | Refills: 0 | Status: SHIPPED | OUTPATIENT
Start: 2025-02-06 | End: 2025-03-08

## 2025-02-06 RX ORDER — CYCLOBENZAPRINE HCL 5 MG
5 TABLET ORAL 3 TIMES DAILY PRN
Status: DISCONTINUED | OUTPATIENT
Start: 2025-02-06 | End: 2025-02-06 | Stop reason: HOSPADM

## 2025-02-06 RX ADMIN — CYCLOBENZAPRINE HYDROCHLORIDE 5 MG: 5 TABLET, FILM COATED ORAL at 09:32

## 2025-02-06 RX ADMIN — TAMSULOSIN HYDROCHLORIDE 0.4 MG: 0.4 CAPSULE ORAL at 09:21

## 2025-02-06 RX ADMIN — PANTOPRAZOLE SODIUM 40 MG: 40 INJECTION, POWDER, FOR SOLUTION INTRAVENOUS at 09:21

## 2025-02-06 RX ADMIN — CEFTRIAXONE SODIUM 1 G: 1 INJECTION, SOLUTION INTRAVENOUS at 09:33

## 2025-02-06 RX ADMIN — ACETAMINOPHEN 650 MG: 325 TABLET ORAL at 09:21

## 2025-02-06 RX ADMIN — ENOXAPARIN SODIUM 40 MG: 40 INJECTION SUBCUTANEOUS at 09:22

## 2025-02-06 RX ADMIN — BETHANECHOL CHLORIDE 25 MG: 25 TABLET ORAL at 09:21

## 2025-02-06 RX ADMIN — ACETAMINOPHEN 650 MG: 325 TABLET ORAL at 04:34

## 2025-02-06 RX ADMIN — PSYLLIUM HUSK 1 PACKET: 3.4 POWDER ORAL at 09:21

## 2025-02-06 ASSESSMENT — COGNITIVE AND FUNCTIONAL STATUS - GENERAL
MOBILITY SCORE: 20
DRESSING REGULAR LOWER BODY CLOTHING: A LITTLE
CLIMB 3 TO 5 STEPS WITH RAILING: A LITTLE
WALKING IN HOSPITAL ROOM: A LITTLE
PERSONAL GROOMING: A LITTLE
STANDING UP FROM CHAIR USING ARMS: A LITTLE
TOILETING: A LITTLE
MOVING TO AND FROM BED TO CHAIR: A LITTLE
DRESSING REGULAR UPPER BODY CLOTHING: A LITTLE
DAILY ACTIVITIY SCORE: 19
HELP NEEDED FOR BATHING: A LITTLE

## 2025-02-06 ASSESSMENT — PAIN SCALES - PAIN ASSESSMENT IN ADVANCED DEMENTIA (PAINAD)
TOTALSCORE: 0
BREATHING: NORMAL
BODYLANGUAGE: RELAXED
CONSOLABILITY: NO NEED TO CONSOLE
TOTALSCORE: MEDICATION (SEE MAR)
FACIALEXPRESSION: SMILING OR INEXPRESSIVE

## 2025-02-06 ASSESSMENT — PAIN DESCRIPTION - DESCRIPTORS
DESCRIPTORS: ACHING
DESCRIPTORS: ACHING

## 2025-02-06 ASSESSMENT — PAIN - FUNCTIONAL ASSESSMENT
PAIN_FUNCTIONAL_ASSESSMENT: 0-10
PAIN_FUNCTIONAL_ASSESSMENT: 0-10

## 2025-02-06 ASSESSMENT — PAIN SCALES - GENERAL
PAINLEVEL_OUTOF10: 3
PAINLEVEL_OUTOF10: 7
PAINLEVEL_OUTOF10: 6

## 2025-02-06 NOTE — CARE PLAN
The patient's goals for the shift include  Pain controlled to neck    The clinical goals for the shift include Pt will remain free from falls throughout shift.

## 2025-02-06 NOTE — PROGRESS NOTES
Kayla Estes 84 y.o. female    Subjective  Patient seen and examined this morning.  Denies nausea and vomiting.  No fever or chills.  Cabezas catheter draining clear yellow urine.  Suprapubic pain has resolved.  Denies CP and SOB.  No acute events overnight.     Objective  PHYSICAL EXAM:  Physical Exam  Vitals reviewed.   Constitutional:       General: She is awake.      Appearance: Normal appearance.   Cardiovascular:      Rate and Rhythm: Normal rate and regular rhythm.      Pulses: Normal pulses.      Heart sounds: Normal heart sounds.   Pulmonary:      Effort: Pulmonary effort is normal.      Breath sounds: Normal breath sounds and air entry.   Abdominal:      General: Abdomen is flat. There is no distension.      Palpations: Abdomen is soft.      Tenderness: There is no abdominal tenderness. There is no right CVA tenderness or left CVA tenderness.   Genitourinary:     Comments: Cabezas catheter with yellow urine   Musculoskeletal:         General: Normal range of motion.      Cervical back: Normal range of motion.   Skin:     General: Skin is warm and dry.   Neurological:      General: No focal deficit present.      Mental Status: She is alert and oriented to person, place, and time.   Psychiatric:         Behavior: Behavior is cooperative.         Vital signs in last 24 hours:  Vitals:    02/06/25 0817   BP: 128/69   Pulse: 94   Resp: 17   Temp: 36.6 °C (97.9 °F)   SpO2: 97%         Intake/Output this shift:    Intake/Output Summary (Last 24 hours) at 2/6/2025 1056  Last data filed at 2/6/2025 1012  Gross per 24 hour   Intake 105 ml   Output 1475 ml   Net -1370 ml        Allergies:  No Known Allergies     Medications:  Scheduled medications  bethanechol, 25 mg, oral, TID  enoxaparin, 40 mg, subcutaneous, Daily  fluticasone, 2 spray, Each Nostril, Nightly  pantoprazole, 40 mg, intravenous, Daily  psyllium, 1 packet, oral, TID  tamsulosin, 0.4 mg, oral, Daily      Continuous medications     PRN  medications  PRN medications: acetaminophen, cyclobenzaprine, ondansetron       Labs:  Results for orders placed or performed during the hospital encounter of 02/03/25 (from the past 24 hours)   Basic Metabolic Panel   Result Value Ref Range    Glucose 96 74 - 99 mg/dL    Sodium 135 (L) 136 - 145 mmol/L    Potassium 3.8 3.5 - 5.3 mmol/L    Chloride 105 98 - 107 mmol/L    Bicarbonate 23 21 - 32 mmol/L    Anion Gap 11 10 - 20 mmol/L    Urea Nitrogen 5 (L) 6 - 23 mg/dL    Creatinine 0.46 (L) 0.50 - 1.05 mg/dL    eGFR >90 >60 mL/min/1.73m*2    Calcium 8.8 8.6 - 10.3 mg/dL   Magnesium   Result Value Ref Range    Magnesium 2.28 1.60 - 2.40 mg/dL        Imaging:  ECG 12 lead    Result Date: 2/3/2025  Normal sinus rhythm Nonspecific ST and T wave abnormality Abnormal ECG When compared with ECG of 21-MAR-2011 10:43, Vent. rate has increased BY  33 BPM Nonspecific T wave abnormality, worse in Inferior leads Nonspecific T wave abnormality now evident in Lateral leads    CT abdomen pelvis w IV contrast    Result Date: 2/3/2025  STUDY: CT Abdomen and Pelvis with IV Contrast; 2/3/2025 10:29 AM. INDICATION: Abdominal pain.  History of rectal cancer. COMPARISON: None Available. ACCESSION NUMBER(S): [id number] ORDERING CLINICIAN: RAFITA PINO TECHNIQUE: CT of the abdomen and pelvis was performed.  Contiguous axial images were obtained at 3 mm slice thickness through the abdomen and pelvis. Coronal and sagittal reconstructions at 3 mm slice thickness were performed.  Omnipaque 350, 70 mL was administered intravenously.  FINDINGS: LOWER CHEST: No cardiomegaly.  No pericardial effusion.  Lung bases are clear.  ABDOMEN:  LIVER: No hepatomegaly.  Smooth surface contour.  Normal attenuation. Subcentimeter hypodense focus in the right hepatic lobe on image 39 2 small to accurately characterize but statistically most likely benign.  No other focal hepatic lesions.   BILE DUCTS: No significant biliary duct dilatation.   GALLBLADDER:  "Distended gallbladder with tiny layering calcifications consistent with cholelithiasis.  No pericholecystic inflammatory changes.  No CT evidence of choledocholithiasis. STOMACH: Small hiatal hernia.  PANCREAS: Pancreas is unremarkable.  SPLEEN: Tiny low-density focus in the superior aspect of the spleen on image 18 is statistically most likely benign.  It is too small to characterize.  No splenomegaly.  ADRENAL GLANDS: Adrenal glands are normal.  KIDNEYS AND URETERS: Kidneys are normal in size and location.  Small cortical cyst upper pole left kidney.  No suspicious renal mass.  No nephrolithiasis or hydronephrosis.  No ureteral stone.  PELVIS:  BLADDER: Distended urinary bladder with area of asymmetric plaque-like wall thickening along the right lateral aspect measuring 5 mm in thickness see axial image 106.  Neoplastic disease is not excluded.  There is pelvic floor laxity.  Small amount of air within the urinary bladder anteriorly presumably from recent catheterization.  No free fluid or hematoma in the pelvis.  REPRODUCTIVE ORGANS: Atrophic uterus.  No adnexal mass.  BOWEL: Duodenal diverticulum noted. Small bowel obstruction with transition in the right lower quadrant.  Proximal to the obstruction there is fecal-like material within the small bowel (\"small bowel feces sign\") axial images 86-92.  There are areas of small bowel wall thickening and increased enhancement consistent with enteritis in multiple locations particularly distal to the transition point as well as multiple areas proximal to the main transition point.  Dilated and fluid-filled small bowel loops proximal to the transition consistent with obstruction.  Trace mesenteric edema central mesentery of the small bowel.  The mesenteric vessels are patent.  No volvulus is evident.  No pneumatosis or free air.  The colon is nondilated.  No acute pathology of the colon.  The appendix is not identified.   VESSELS: Calcific plaque of the walls of the " abdominal aorta without focal stenosis, occlusion, dissection, or aneurysm.  Iliac arteries are patent without aneurysm.  Dilated blood vessels are noted within the left inguinal canal.   PERITONEUM/RETROPERITONEUM/LYMPH NODES: Minimal free fluid superior to the liver and in the right lower quadrant.  No rim-enhancing or drainable collection.  No pneumoperitoneum. Lymph nodes in the small bowel mesentery somewhat conspicuous in overall number more than size, but one of the larger measuring 1 cm short axis image 62.  These are nonspecific.  No calcified mesenteric masses.  ABDOMINAL WALL: Intact SOFT TISSUES: No acute findings  BONES: Posterior fusion hardware at L4-L5 with pedicle screw and sara internal fixation and interbody spacer device.  No compression deformities of the visualized spine.  No acute fracture or aggressive osseous lesion.    Moderate to high-grade small bowel obstruction with transition in the right lower quadrant likely secondary to enteritis and/or multifocal areas of stenosis/strictures of the small bowel associated with enteritis.  No free air or abscess.  The small bowel is viable. Plaque-like wall thickening of the right lateral urinary bladder. Neoplastic disease is suspected.  Urology follow-up recommended for cystoscopy evaluation. Minimal ascites. Cholelithiasis. Hiatal hernia. Signed by Seven Best,            Plan    #Urinary Retention     - Bladder scanned this last evening for over 300cc of urine.  Indwelling christianson catheter inserted.   - Start flomax 0.4mg daily  - Start urecholine 25mg TID  - Encourage ambulation  - Maintain christianson catheter on discharge.  Will follow up in next couple of days for TOV as outpatient.      Plan of care discussed with Dr. Browne and urology will sign off.  Please call with any concerns.      HAKEEM Teran-CNP     I spent 20 minutes in the professional and overall care of this patient.

## 2025-02-06 NOTE — PROGRESS NOTES
Kayla Estes is a 84 y.o. female on day 2 of admission presenting with Small bowel obstruction (Multi).      Subjective   Had multiple bowel movement.  Informed patient and family that she is medically stable for discharge but they would like to stay overnight as they would feel more comfortable and make sure she is having normal bowel movement        Objective     Last Recorded Vitals  /69 (BP Location: Left arm, Patient Position: Lying)   Pulse 94   Temp 36.6 °C (97.9 °F)   Resp 17   Wt 56.2 kg (123 lb 14.4 oz)   SpO2 97%   Intake/Output last 3 Shifts:    Intake/Output Summary (Last 24 hours) at 2/6/2025 1128  Last data filed at 2/6/2025 1012  Gross per 24 hour   Intake 105 ml   Output 1475 ml   Net -1370 ml       Admission Weight  Weight: 56.2 kg (124 lb) (02/03/25 0733)    Daily Weight  02/04/25 : 56.2 kg (123 lb 14.4 oz)      Physical Exam:  General: Not in acute distress, alert.  On room air  HEENT: PERRLA, head intact and normocephalic  Neck: Normal to inspection  Lungs: Clear to auscultation, work of breathing within normal limit  Cardiac: Regular rate and rhythm  Abdomen: Soft without significant tenderness, positive bowel sounds  : Exam deferred  Skin: Intact  Hematology: No petechia or excessive ecchymosis  Musculoskeletal: Without significant trauma  Neurological: Alert awake oriented, no focal deficit, cranial nerves grossly intact  Psych: No suicidal ideation or homicidal ideation    Relevant Results  Scheduled medications  bethanechol, 25 mg, oral, TID  enoxaparin, 40 mg, subcutaneous, Daily  fluticasone, 2 spray, Each Nostril, Nightly  pantoprazole, 40 mg, intravenous, Daily  psyllium, 1 packet, oral, TID  tamsulosin, 0.4 mg, oral, Daily      Continuous medications       PRN medications  PRN medications: acetaminophen, cyclobenzaprine, ondansetron   Labs  Results from last 7 days   Lab Units 02/05/25  0512 02/04/25  0748 02/03/25  0746   WBC AUTO x10*3/uL 4.8 7.7 12.6*   HEMOGLOBIN  g/dL 8.4* 8.4* 10.4*   HEMATOCRIT % 27.3* 27.5* 32.6*   PLATELETS AUTO x10*3/uL 335 335 391     Results from last 7 days   Lab Units 02/06/25  0915 02/05/25  0512 02/04/25  0748 02/03/25  0746   SODIUM mmol/L 135* 135* 135* 136   POTASSIUM mmol/L 3.8 2.8* 3.2* 4.3   CHLORIDE mmol/L 105 104 105 101   CO2 mmol/L 23 21 25 24   BUN mg/dL 5* 8 11 13   CREATININE mg/dL 0.46* 0.56 0.63 0.69   CALCIUM mg/dL 8.8 8.1* 8.0* 9.4   PROTEIN TOTAL g/dL  --   --   --  7.2   BILIRUBIN TOTAL mg/dL  --   --   --  0.9   ALK PHOS U/L  --   --   --  53   ALT U/L  --   --   --  10   AST U/L  --   --   --  22   GLUCOSE mg/dL 96 107* 100* 105*       ECG 12 lead    Result Date: 2/3/2025  Normal sinus rhythm Nonspecific ST and T wave abnormality Abnormal ECG When compared with ECG of 21-MAR-2011 10:43, Vent. rate has increased BY  33 BPM Nonspecific T wave abnormality, worse in Inferior leads Nonspecific T wave abnormality now evident in Lateral leads    CT abdomen pelvis w IV contrast    Result Date: 2/3/2025  STUDY: CT Abdomen and Pelvis with IV Contrast; 2/3/2025 10:29 AM. INDICATION: Abdominal pain.  History of rectal cancer. COMPARISON: None Available. ACCESSION NUMBER(S): [id number] ORDERING CLINICIAN: RAFITA PINO TECHNIQUE: CT of the abdomen and pelvis was performed.  Contiguous axial images were obtained at 3 mm slice thickness through the abdomen and pelvis. Coronal and sagittal reconstructions at 3 mm slice thickness were performed.  Omnipaque 350, 70 mL was administered intravenously.  FINDINGS: LOWER CHEST: No cardiomegaly.  No pericardial effusion.  Lung bases are clear.  ABDOMEN:  LIVER: No hepatomegaly.  Smooth surface contour.  Normal attenuation. Subcentimeter hypodense focus in the right hepatic lobe on image 39 2 small to accurately characterize but statistically most likely benign.  No other focal hepatic lesions.   BILE DUCTS: No significant biliary duct dilatation.   GALLBLADDER: Distended gallbladder with tiny  "layering calcifications consistent with cholelithiasis.  No pericholecystic inflammatory changes.  No CT evidence of choledocholithiasis. STOMACH: Small hiatal hernia.  PANCREAS: Pancreas is unremarkable.  SPLEEN: Tiny low-density focus in the superior aspect of the spleen on image 18 is statistically most likely benign.  It is too small to characterize.  No splenomegaly.  ADRENAL GLANDS: Adrenal glands are normal.  KIDNEYS AND URETERS: Kidneys are normal in size and location.  Small cortical cyst upper pole left kidney.  No suspicious renal mass.  No nephrolithiasis or hydronephrosis.  No ureteral stone.  PELVIS:  BLADDER: Distended urinary bladder with area of asymmetric plaque-like wall thickening along the right lateral aspect measuring 5 mm in thickness see axial image 106.  Neoplastic disease is not excluded.  There is pelvic floor laxity.  Small amount of air within the urinary bladder anteriorly presumably from recent catheterization.  No free fluid or hematoma in the pelvis.  REPRODUCTIVE ORGANS: Atrophic uterus.  No adnexal mass.  BOWEL: Duodenal diverticulum noted. Small bowel obstruction with transition in the right lower quadrant.  Proximal to the obstruction there is fecal-like material within the small bowel (\"small bowel feces sign\") axial images 86-92.  There are areas of small bowel wall thickening and increased enhancement consistent with enteritis in multiple locations particularly distal to the transition point as well as multiple areas proximal to the main transition point.  Dilated and fluid-filled small bowel loops proximal to the transition consistent with obstruction.  Trace mesenteric edema central mesentery of the small bowel.  The mesenteric vessels are patent.  No volvulus is evident.  No pneumatosis or free air.  The colon is nondilated.  No acute pathology of the colon.  The appendix is not identified.   VESSELS: Calcific plaque of the walls of the abdominal aorta without focal " stenosis, occlusion, dissection, or aneurysm.  Iliac arteries are patent without aneurysm.  Dilated blood vessels are noted within the left inguinal canal.   PERITONEUM/RETROPERITONEUM/LYMPH NODES: Minimal free fluid superior to the liver and in the right lower quadrant.  No rim-enhancing or drainable collection.  No pneumoperitoneum. Lymph nodes in the small bowel mesentery somewhat conspicuous in overall number more than size, but one of the larger measuring 1 cm short axis image 62.  These are nonspecific.  No calcified mesenteric masses.  ABDOMINAL WALL: Intact SOFT TISSUES: No acute findings  BONES: Posterior fusion hardware at L4-L5 with pedicle screw and sara internal fixation and interbody spacer device.  No compression deformities of the visualized spine.  No acute fracture or aggressive osseous lesion.    Moderate to high-grade small bowel obstruction with transition in the right lower quadrant likely secondary to enteritis and/or multifocal areas of stenosis/strictures of the small bowel associated with enteritis.  No free air or abscess.  The small bowel is viable. Plaque-like wall thickening of the right lateral urinary bladder. Neoplastic disease is suspected.  Urology follow-up recommended for cystoscopy evaluation. Minimal ascites. Cholelithiasis. Hiatal hernia. Signed by Seven Best,                     Assessment/Plan   Kayla Estes is a 84 y.o. female on day 2 of admission presenting with Small bowel obstruction (Multi).  Assessment & Plan  Small bowel obstruction (Multi)    84-year-old female with past medical history of anal cancer status post chemotherapy and radiation, GERD, hypertension presented to emergency department for abdominal pain on 2/3/2025 and was found to have moderate to high-grade SBO with a transition point in right lower quadrant with multifocal area of stenosis/stricture.    Moderate to high-grade SBO versus ileus/enteritis from UTI  Resolved after enemas and having  bowel movement  Likely had hard stool along with ileus from UTI  Treatment of UTI  Talked about bowel regimen and uptitrating Metamucil or MiraLAX to achieve 1 soft bowel movement per day and patient and son-in-law expressed verbal understanding    Nitrite positive UTI present on admission  E. coli  Switch to Keflex on discharge  Urinary retention  Cabezas catheter placed  Will need to follow-up with urology as outpatient for voiding trial as outpatient    History of anal cancer  Status post chemo and radiation  Plaque-like wall thickening of the right lateral urinary bladder is noted with neoplastic disease is suspected  Urology is consulted    Plaque-like wall thickening of the right lateral urinary bladder plus urinary retention  Cabezas catheter placed  Outpatient urology follow-up for voiding trial  Flomax and bethanechol    GERD  Protonix    DVT prophylaxis  Ambulation and SCDs     Plan discussed with patient, son-in-law at bedside  Disposition: Patient is medically ready for discharge  High level of MDM based on above issue and discussing plan    This note is created using voice recognition software. All efforts are made to minimize errors, if there are errors there due to transcription.    Yoni Kirkland  Hospitalist

## 2025-02-06 NOTE — CARE PLAN
The patient's goals for the shift include      The clinical goals for the shift include Pt will remain free from falls throughout shift.    Over the shift, the patient did not make progress toward the following goals. Barriers to progression include . Recommendations to address these barriers include .

## 2025-02-22 LAB
ATRIAL RATE: 94 BPM
P AXIS: 54 DEGREES
P OFFSET: 176 MS
P ONSET: 146 MS
PR INTERVAL: 162 MS
Q ONSET: 227 MS
QRS COUNT: 16 BEATS
QRS DURATION: 78 MS
QT INTERVAL: 372 MS
QTC CALCULATION(BAZETT): 465 MS
QTC FREDERICIA: 432 MS
R AXIS: 41 DEGREES
T AXIS: -2 DEGREES
T OFFSET: 413 MS
VENTRICULAR RATE: 94 BPM
